# Patient Record
Sex: MALE | Race: BLACK OR AFRICAN AMERICAN | NOT HISPANIC OR LATINO | URBAN - METROPOLITAN AREA
[De-identification: names, ages, dates, MRNs, and addresses within clinical notes are randomized per-mention and may not be internally consistent; named-entity substitution may affect disease eponyms.]

---

## 2017-09-29 ENCOUNTER — EMERGENCY (EMERGENCY)
Facility: HOSPITAL | Age: 47
LOS: 1 days | Discharge: HOME | End: 2017-09-29
Attending: EMERGENCY MEDICINE
Payer: MEDICAID

## 2017-09-29 VITALS
SYSTOLIC BLOOD PRESSURE: 130 MMHG | HEART RATE: 80 BPM | RESPIRATION RATE: 18 BRPM | OXYGEN SATURATION: 99 % | DIASTOLIC BLOOD PRESSURE: 85 MMHG

## 2017-09-29 VITALS
OXYGEN SATURATION: 98 % | WEIGHT: 240.08 LBS | DIASTOLIC BLOOD PRESSURE: 85 MMHG | HEIGHT: 69 IN | SYSTOLIC BLOOD PRESSURE: 137 MMHG | HEART RATE: 101 BPM | TEMPERATURE: 98 F | RESPIRATION RATE: 18 BRPM

## 2017-09-29 PROCEDURE — 96372 THER/PROPH/DIAG INJ SC/IM: CPT

## 2017-09-29 PROCEDURE — 99284 EMERGENCY DEPT VISIT MOD MDM: CPT

## 2017-09-29 PROCEDURE — 99283 EMERGENCY DEPT VISIT LOW MDM: CPT | Mod: 25

## 2017-09-29 RX ORDER — CYCLOBENZAPRINE HYDROCHLORIDE 10 MG/1
1 TABLET, FILM COATED ORAL
Qty: 20 | Refills: 0 | OUTPATIENT
Start: 2017-09-29 | End: 2017-10-09

## 2017-09-29 RX ORDER — OXYCODONE AND ACETAMINOPHEN 5; 325 MG/1; MG/1
1 TABLET ORAL ONCE
Qty: 0 | Refills: 0 | Status: DISCONTINUED | OUTPATIENT
Start: 2017-09-29 | End: 2017-09-29

## 2017-09-29 RX ORDER — DIAZEPAM 5 MG
10 TABLET ORAL ONCE
Qty: 0 | Refills: 0 | Status: DISCONTINUED | OUTPATIENT
Start: 2017-09-29 | End: 2017-09-29

## 2017-09-29 RX ORDER — DEXAMETHASONE 0.5 MG/5ML
10 ELIXIR ORAL ONCE
Qty: 0 | Refills: 0 | Status: COMPLETED | OUTPATIENT
Start: 2017-09-29 | End: 2017-09-29

## 2017-09-29 RX ORDER — KETOROLAC TROMETHAMINE 30 MG/ML
60 SYRINGE (ML) INJECTION ONCE
Qty: 0 | Refills: 0 | Status: DISCONTINUED | OUTPATIENT
Start: 2017-09-29 | End: 2017-09-29

## 2017-09-29 RX ADMIN — OXYCODONE AND ACETAMINOPHEN 1 TABLET(S): 5; 325 TABLET ORAL at 12:12

## 2017-09-29 RX ADMIN — Medication 60 MILLIGRAM(S): at 12:09

## 2017-09-29 RX ADMIN — Medication 10 MILLIGRAM(S): at 12:10

## 2017-09-29 RX ADMIN — OXYCODONE AND ACETAMINOPHEN 1 TABLET(S): 5; 325 TABLET ORAL at 13:48

## 2017-09-29 RX ADMIN — Medication 10 MILLIGRAM(S): at 12:09

## 2017-09-29 RX ADMIN — Medication 60 MILLIGRAM(S): at 13:48

## 2017-09-29 NOTE — ED PROVIDER NOTE - MEDICAL DECISION MAKING DETAILS
Patient will f/u with spine for MRI w/o fail.  Patient given detailed discharge and return instructions and verbalized understanding.  Patient will follow up without fail.  All questions answered.

## 2017-09-29 NOTE — ED ADULT NURSE NOTE - OBJECTIVE STATEMENT
Assumed patient care at 1159.  Pt reports sneezing this mornig and throwing his back out.  C/O pain to left side lower back.  States his big toe is numb.  Moves left leg without difficulty.  Pt states he fell out work in 2012 and injured his back.  His toe has been numb since then.  He has seen pain management in the past for injections.

## 2017-09-29 NOTE — ED ADULT NURSE NOTE - PMH
Diabetes mellitus, type II    Flank pain, acute    GERD (gastroesophageal reflux disease)    Sciatica Diabetes mellitus, type II    Flank pain, acute    GERD (gastroesophageal reflux disease)    Lumbar disc herniation    Sciatica

## 2017-09-29 NOTE — ED PROVIDER NOTE - OBJECTIVE STATEMENT
CC: left low back pain  Presenting symptoms: 46yo male with chronic back pain and multiple exacerbations in past and has had full workup including MRIs and epidural injections.  Patient states was walking today and sneezed and threw his back out with left low back pain radiating to buttock and left leg.  Patient states he has had same multiple times in past.    Pertinent Positives: + low back pain  Pertinent Negatives: no CP, no SOB, no fever, no HA, no abd pain, no N/V  Timing: just prior to arrival  Quality: spasm  Radiation: left buttock and leg  Severity: severe  Aggravating Factors: movement  Relieving Factors: none

## 2017-09-29 NOTE — ED PROVIDER NOTE - PMH
Diabetes mellitus, type II    Flank pain, acute    GERD (gastroesophageal reflux disease)    Lumbar disc herniation    Sciatica

## 2017-09-29 NOTE — ED PROVIDER NOTE - NS ED ROS FT
no fever  no chest pain  no SOB  no abd pain  no HA  + back pain  All other ROS negative except as per HPI

## 2017-09-29 NOTE — ED ADULT TRIAGE NOTE - CHIEF COMPLAINT QUOTE
Patient BIBA, states he sneezed this morning and "threw his back out", states this has happened before, pain to lower back

## 2017-09-29 NOTE — ED ADULT NURSE REASSESSMENT NOTE - NS ED NURSE REASSESS COMMENT FT1
Pt has no transportation home.   called and made aware.  Will see patient.
Tolerating BIPAP.  Sister at bedside.

## 2017-11-09 ENCOUNTER — EMERGENCY (EMERGENCY)
Facility: HOSPITAL | Age: 47
LOS: 1 days | Discharge: HOME | End: 2017-11-09
Attending: EMERGENCY MEDICINE | Admitting: EMERGENCY MEDICINE
Payer: MEDICAID

## 2017-11-09 VITALS
RESPIRATION RATE: 18 BRPM | DIASTOLIC BLOOD PRESSURE: 76 MMHG | TEMPERATURE: 98 F | HEART RATE: 68 BPM | OXYGEN SATURATION: 98 % | WEIGHT: 229.94 LBS | HEIGHT: 70 IN | SYSTOLIC BLOOD PRESSURE: 121 MMHG

## 2017-11-09 PROCEDURE — 99284 EMERGENCY DEPT VISIT MOD MDM: CPT

## 2017-11-09 PROCEDURE — 99283 EMERGENCY DEPT VISIT LOW MDM: CPT | Mod: 25

## 2017-11-09 PROCEDURE — 93010 ELECTROCARDIOGRAM REPORT: CPT

## 2017-11-09 PROCEDURE — 93005 ELECTROCARDIOGRAM TRACING: CPT

## 2017-11-09 NOTE — ED PROVIDER NOTE - PHYSICAL EXAMINATION
neuro: CN II - XII intact, EOMI, PERRL, no papilledema, 5/5 muscle strength x 4 extremities, no sensory deficits, 2+ dtr globally, negative babinski, no ataxic gait, normal VALERIO and FNT, normal romberg

## 2017-11-09 NOTE — ED PROVIDER NOTE - MEDICAL DECISION MAKING DETAILS
return to ed for intractable HA, persistent vomiting, or new onset motor/sensory deficits   no zbigniew pain or saob cardio f.u without fail advised

## 2017-11-09 NOTE — ED PROVIDER NOTE - OBJECTIVE STATEMENT
pt presents after witnessed syncopal episode no seizure like activity . denies any acute pain no  hemoptysis no h/o dvt or pe . denies fever. denies HA or neck pain. no chest pain or sob. no abd pain. no n/v/d. no urinary f/u/d. no back pain. no motor or sensory deficits. denies illicit drug use. no recent travel. no rash. no other acute issues symptoms or concerns no leg swelling

## 2017-12-13 ENCOUNTER — APPOINTMENT (OUTPATIENT)
Dept: NEUROLOGY | Facility: CLINIC | Age: 47
End: 2017-12-13
Payer: MEDICAID

## 2017-12-13 DIAGNOSIS — Z87.891 PERSONAL HISTORY OF NICOTINE DEPENDENCE: ICD-10-CM

## 2017-12-13 PROCEDURE — 99203 OFFICE O/P NEW LOW 30 MIN: CPT

## 2017-12-18 ENCOUNTER — APPOINTMENT (OUTPATIENT)
Dept: NEUROLOGY | Facility: CLINIC | Age: 47
End: 2017-12-18

## 2017-12-27 ENCOUNTER — APPOINTMENT (OUTPATIENT)
Dept: NEUROLOGY | Facility: CLINIC | Age: 47
End: 2017-12-27

## 2018-01-08 ENCOUNTER — APPOINTMENT (OUTPATIENT)
Dept: NEUROLOGY | Facility: CLINIC | Age: 48
End: 2018-01-08

## 2018-07-25 PROBLEM — M51.26 OTHER INTERVERTEBRAL DISC DISPLACEMENT, LUMBAR REGION: Chronic | Status: ACTIVE | Noted: 2017-09-29

## 2018-08-01 ENCOUNTER — APPOINTMENT (OUTPATIENT)
Dept: CARDIOLOGY | Facility: CLINIC | Age: 48
End: 2018-08-01
Payer: MEDICAID

## 2018-08-01 ENCOUNTER — NON-APPOINTMENT (OUTPATIENT)
Age: 48
End: 2018-08-01

## 2018-08-01 VITALS
BODY MASS INDEX: 34.56 KG/M2 | DIASTOLIC BLOOD PRESSURE: 73 MMHG | OXYGEN SATURATION: 96 % | WEIGHT: 228 LBS | HEART RATE: 71 BPM | SYSTOLIC BLOOD PRESSURE: 116 MMHG | HEIGHT: 68 IN

## 2018-08-01 PROCEDURE — 93000 ELECTROCARDIOGRAM COMPLETE: CPT

## 2018-08-01 PROCEDURE — 99244 OFF/OP CNSLTJ NEW/EST MOD 40: CPT

## 2018-08-01 RX ORDER — ESOMEPRAZOLE MAGNESIUM 40 MG/1
40 CAPSULE, DELAYED RELEASE ORAL
Refills: 0 | Status: DISCONTINUED | COMMUNITY
End: 2018-08-01

## 2018-08-02 ENCOUNTER — EMERGENCY (EMERGENCY)
Facility: HOSPITAL | Age: 48
LOS: 1 days | Discharge: HOME | End: 2018-08-02
Attending: EMERGENCY MEDICINE
Payer: MEDICAID

## 2018-08-02 VITALS
DIASTOLIC BLOOD PRESSURE: 91 MMHG | OXYGEN SATURATION: 99 % | WEIGHT: 230.6 LBS | RESPIRATION RATE: 14 BRPM | HEIGHT: 69 IN | TEMPERATURE: 97 F | SYSTOLIC BLOOD PRESSURE: 141 MMHG | HEART RATE: 58 BPM

## 2018-08-02 VITALS
DIASTOLIC BLOOD PRESSURE: 72 MMHG | HEART RATE: 63 BPM | RESPIRATION RATE: 14 BRPM | SYSTOLIC BLOOD PRESSURE: 126 MMHG | OXYGEN SATURATION: 98 %

## 2018-08-02 LAB
ALBUMIN SERPL ELPH-MCNC: 3.7 G/DL — SIGNIFICANT CHANGE UP (ref 3.3–5)
ALP SERPL-CCNC: 82 U/L — SIGNIFICANT CHANGE UP (ref 40–120)
ALT FLD-CCNC: 55 U/L — SIGNIFICANT CHANGE UP (ref 12–78)
ANION GAP SERPL CALC-SCNC: 8 MMOL/L — SIGNIFICANT CHANGE UP (ref 5–17)
APTT BLD: 34.6 SEC — SIGNIFICANT CHANGE UP (ref 27.5–37.4)
AST SERPL-CCNC: 30 U/L — SIGNIFICANT CHANGE UP (ref 15–37)
BASOPHILS # BLD AUTO: 0.04 K/UL — SIGNIFICANT CHANGE UP (ref 0–0.2)
BASOPHILS NFR BLD AUTO: 0.7 % — SIGNIFICANT CHANGE UP (ref 0–2)
BILIRUB SERPL-MCNC: 0.4 MG/DL — SIGNIFICANT CHANGE UP (ref 0.2–1.2)
BUN SERPL-MCNC: 15 MG/DL — SIGNIFICANT CHANGE UP (ref 7–23)
CALCIUM SERPL-MCNC: 8.8 MG/DL — SIGNIFICANT CHANGE UP (ref 8.5–10.1)
CHLORIDE SERPL-SCNC: 108 MMOL/L — SIGNIFICANT CHANGE UP (ref 96–108)
CK MB BLD-MCNC: 0.6 % — SIGNIFICANT CHANGE UP (ref 0–3.5)
CK MB BLD-MCNC: 0.6 % — SIGNIFICANT CHANGE UP (ref 0–3.5)
CK MB CFR SERPL CALC: 4.3 NG/ML — HIGH (ref 0–3.6)
CK MB CFR SERPL CALC: 5.1 NG/ML — HIGH (ref 0–3.6)
CK SERPL-CCNC: 741 U/L — HIGH (ref 26–308)
CK SERPL-CCNC: 809 U/L — HIGH (ref 26–308)
CO2 SERPL-SCNC: 26 MMOL/L — SIGNIFICANT CHANGE UP (ref 22–31)
CREAT SERPL-MCNC: 1 MG/DL — SIGNIFICANT CHANGE UP (ref 0.5–1.3)
EOSINOPHIL # BLD AUTO: 0.27 K/UL — SIGNIFICANT CHANGE UP (ref 0–0.5)
EOSINOPHIL NFR BLD AUTO: 4.4 % — SIGNIFICANT CHANGE UP (ref 0–6)
GLUCOSE SERPL-MCNC: 78 MG/DL — SIGNIFICANT CHANGE UP (ref 70–99)
HCT VFR BLD CALC: 38.6 % — LOW (ref 39–50)
HGB BLD-MCNC: 13.6 G/DL — SIGNIFICANT CHANGE UP (ref 13–17)
IMM GRANULOCYTES NFR BLD AUTO: 0.3 % — SIGNIFICANT CHANGE UP (ref 0–1.5)
INR BLD: 1 RATIO — SIGNIFICANT CHANGE UP (ref 0.88–1.16)
LIDOCAIN IGE QN: 119 U/L — SIGNIFICANT CHANGE UP (ref 73–393)
LYMPHOCYTES # BLD AUTO: 1.85 K/UL — SIGNIFICANT CHANGE UP (ref 1–3.3)
LYMPHOCYTES # BLD AUTO: 30.4 % — SIGNIFICANT CHANGE UP (ref 13–44)
MCHC RBC-ENTMCNC: 32.6 PG — SIGNIFICANT CHANGE UP (ref 27–34)
MCHC RBC-ENTMCNC: 35.2 GM/DL — SIGNIFICANT CHANGE UP (ref 32–36)
MCV RBC AUTO: 92.6 FL — SIGNIFICANT CHANGE UP (ref 80–100)
MONOCYTES # BLD AUTO: 0.52 K/UL — SIGNIFICANT CHANGE UP (ref 0–0.9)
MONOCYTES NFR BLD AUTO: 8.6 % — SIGNIFICANT CHANGE UP (ref 2–14)
NEUTROPHILS # BLD AUTO: 3.38 K/UL — SIGNIFICANT CHANGE UP (ref 1.8–7.4)
NEUTROPHILS NFR BLD AUTO: 55.6 % — SIGNIFICANT CHANGE UP (ref 43–77)
PLATELET # BLD AUTO: 243 K/UL — SIGNIFICANT CHANGE UP (ref 150–400)
POTASSIUM SERPL-MCNC: 3.9 MMOL/L — SIGNIFICANT CHANGE UP (ref 3.5–5.3)
POTASSIUM SERPL-SCNC: 3.9 MMOL/L — SIGNIFICANT CHANGE UP (ref 3.5–5.3)
PROT SERPL-MCNC: 7.2 G/DL — SIGNIFICANT CHANGE UP (ref 6–8.3)
PROTHROM AB SERPL-ACNC: 10.9 SEC — SIGNIFICANT CHANGE UP (ref 9.8–12.7)
RBC # BLD: 4.17 M/UL — LOW (ref 4.2–5.8)
RBC # FLD: 12.6 % — SIGNIFICANT CHANGE UP (ref 10.3–14.5)
SODIUM SERPL-SCNC: 142 MMOL/L — SIGNIFICANT CHANGE UP (ref 135–145)
TROPONIN I SERPL-MCNC: <.015 NG/ML — SIGNIFICANT CHANGE UP (ref 0.01–0.04)
TROPONIN I SERPL-MCNC: <.015 NG/ML — SIGNIFICANT CHANGE UP (ref 0.01–0.04)
WBC # BLD: 6.08 K/UL — SIGNIFICANT CHANGE UP (ref 3.8–10.5)
WBC # FLD AUTO: 6.08 K/UL — SIGNIFICANT CHANGE UP (ref 3.8–10.5)

## 2018-08-02 PROCEDURE — 71045 X-RAY EXAM CHEST 1 VIEW: CPT | Mod: 26

## 2018-08-02 PROCEDURE — 99285 EMERGENCY DEPT VISIT HI MDM: CPT

## 2018-08-02 PROCEDURE — 80053 COMPREHEN METABOLIC PANEL: CPT

## 2018-08-02 PROCEDURE — 85730 THROMBOPLASTIN TIME PARTIAL: CPT

## 2018-08-02 PROCEDURE — 99284 EMERGENCY DEPT VISIT MOD MDM: CPT

## 2018-08-02 PROCEDURE — 82550 ASSAY OF CK (CPK): CPT

## 2018-08-02 PROCEDURE — 85379 FIBRIN DEGRADATION QUANT: CPT

## 2018-08-02 PROCEDURE — 99284 EMERGENCY DEPT VISIT MOD MDM: CPT | Mod: 25

## 2018-08-02 PROCEDURE — 36415 COLL VENOUS BLD VENIPUNCTURE: CPT

## 2018-08-02 PROCEDURE — 93005 ELECTROCARDIOGRAM TRACING: CPT

## 2018-08-02 PROCEDURE — 83690 ASSAY OF LIPASE: CPT

## 2018-08-02 PROCEDURE — 85027 COMPLETE CBC AUTOMATED: CPT

## 2018-08-02 PROCEDURE — 84484 ASSAY OF TROPONIN QUANT: CPT

## 2018-08-02 PROCEDURE — 85610 PROTHROMBIN TIME: CPT

## 2018-08-02 PROCEDURE — 82553 CREATINE MB FRACTION: CPT

## 2018-08-02 PROCEDURE — 71045 X-RAY EXAM CHEST 1 VIEW: CPT

## 2018-08-02 RX ORDER — SODIUM CHLORIDE 9 MG/ML
1000 INJECTION INTRAMUSCULAR; INTRAVENOUS; SUBCUTANEOUS ONCE
Qty: 0 | Refills: 0 | Status: COMPLETED | OUTPATIENT
Start: 2018-08-02 | End: 2018-08-02

## 2018-08-02 RX ORDER — SODIUM CHLORIDE 9 MG/ML
3 INJECTION INTRAMUSCULAR; INTRAVENOUS; SUBCUTANEOUS ONCE
Qty: 0 | Refills: 0 | Status: COMPLETED | OUTPATIENT
Start: 2018-08-02 | End: 2018-08-02

## 2018-08-02 RX ADMIN — SODIUM CHLORIDE 1000 MILLILITER(S): 9 INJECTION INTRAMUSCULAR; INTRAVENOUS; SUBCUTANEOUS at 11:25

## 2018-08-02 RX ADMIN — SODIUM CHLORIDE 1000 MILLILITER(S): 9 INJECTION INTRAMUSCULAR; INTRAVENOUS; SUBCUTANEOUS at 09:56

## 2018-08-02 RX ADMIN — SODIUM CHLORIDE 3 MILLILITER(S): 9 INJECTION INTRAMUSCULAR; INTRAVENOUS; SUBCUTANEOUS at 09:12

## 2018-08-02 RX ADMIN — SODIUM CHLORIDE 1000 MILLILITER(S): 9 INJECTION INTRAMUSCULAR; INTRAVENOUS; SUBCUTANEOUS at 10:56

## 2018-08-02 NOTE — ED ADULT TRIAGE NOTE - NS ED NOTE AC HIGH RISK COUNTRIES
I have reviewed discharge instructions with the parent. The parent verbalized understanding. Patient left ED via Discharge Method: ambulatory to Home with (mother). Opportunity for questions and clarification provided. Patient given 0 scripts. To continue your aftercare when you leave the hospital, you may receive an automated call from our care team to check in on how you are doing. This is a free service and part of our promise to provide the best care and service to meet your aftercare needs.  If you have questions, or wish to unsubscribe from this service please call [phone]. Thank you for Choosing our [facility].
No

## 2018-08-02 NOTE — ED PROVIDER NOTE - CARE PLAN
Principal Discharge DX:	Chest pain  Assessment and plan of treatment:	Return to the ED for any new or worsening symptoms  Take your medication as prescribed  Stop your energy drink intake   Follow up with your cardiologist, call tomorrow to update them   Advance activity as tolerated  Secondary Diagnosis:	Palpitations  Secondary Diagnosis:	Near syncope

## 2018-08-02 NOTE — CONSULT NOTE ADULT - SUBJECTIVE AND OBJECTIVE BOX
[facility]         Janene Flannery, Aguila, Yuliet, Jeannie, Kourtney Tai        [phone] (office)    CHIEF COMPLAINT: Patient is a 48y old  Male who presents with a chief complaint of chest pain.    HPI: Pt is a 49 yo male who presents to the ED with a cc of chest pain and near syncope.  PMHx of HLD, GERD, pre-DM.  Pt reports that last Thursday he noted that she was experiencing episodes of chest pressure with associated palpitations.  He admits that he was drinking 5 24oz + 2 16oz monsters per day and thought that these symptoms may be related.  He followed up with his PMD and was then sent to a cardiologist.  He saw the cardiologist yesterday and was fitted with a Holter monitor. He then drank 2 16 oz monsters after his appointment.  He is suppose to follow up for a nuclear stress and ECHO.  He reports that he has cut down on his Monster intake but did have 2 cans yesterday.  Today pt reports that he was standing at the sink cutting steak when he developed palpitations and chest pressure.  He then reports that he felt himself falling to the ground onto one knee.  Denies striking his head, denies LOC.  When pt stood he felt warm and drank some water.  He then called EMS and was taken to the ED for further elv.  On arrival pt reports that all symptoms had resolved.  Denies HA, visual changes, N/V/D/C, SOB, abd pain, ext numbness or weakness.  Pt does report a family history of early heart disease in his father and maternal uncle.  He has no known underlying CAD.  Denies any recent changes to his exercise tolerance.  Pt is a former smoker    Sees Dr. Pruitt.      PAST MEDICAL & SURGICAL HISTORY:  Pre-diabetes  GERD (gastroesophageal reflux disease)  HLD (hyperlipidemia)  Lumbar disc herniation  GERD (gastroesophageal reflux disease)  Sciatica  Diabetes mellitus, type II  Flank pain, acute  No significant past surgical history  No significant past surgical history      SOCIAL HISTORY: quit smoking in 2009, alcohol or illicit drug use    FAMILY HISTORY:  Father with heart disease  maternal uncle with heart disease.     Outpatient medications:  pantoprazole  MEDICATIONS  (STANDING):  sodium chloride 0.9% Bolus 1000 milliLiter(s) IV Bolus once    MEDICATIONS  (PRN):      Allergies    tramadol (Rash)  tramadol (Urticaria)    Intolerances        REVIEW OF SYSTEMS: Is negative for eye, ENT, GI, , allergic, dermatologic, musculoskeletal and neurologic, except as described above.    VITAL SIGNS:   Vital Signs Last 24 Hrs  T(C): 36.1 (02 Aug 2018 08:48), Max: 36.1 (02 Aug 2018 08:48)  T(F): 97 (02 Aug 2018 08:48), Max: 97 (02 Aug 2018 08:48)  HR: 58 (02 Aug 2018 08:48) (58 - 58)  BP: 141/91 (02 Aug 2018 08:48) (141/91 - 141/91)  BP(mean): --  RR: 14 (02 Aug 2018 08:48) (14 - 14)  SpO2: 99% (02 Aug 2018 08:48) (99% - 99%)    I&O's Summary      PHYSICAL EXAM:    Constitutional: NAD, awake and alert, well-developed  Eyes:  EOMI, no oral cyanosis, conjunctivae clear, anicteric.  Pulmonary: Non-labored, breath sounds are clear bilaterally, no wheezing, rales or rhonchi  Cardiovascular:  regular S1 and S2. No murmur.  No rubs, gallops or clicks  Gastrointestinal: Bowel Sounds present, soft, nontender.   Lymph: No peripheral edema.   Neurological: Alert, strength and sensitivity are grossly intact  Skin: No obvious lesions/rashes.   Psych:  Mood & affect appropriate .    LABS: All Labs Reviewed:                        13.6   6.08  )-----------( 243      ( 02 Aug 2018 09:16 )             38.6     02 Aug 2018 09:16    142    |  108    |  15     ----------------------------<  78     3.9     |  26     |  1.00     Ca    8.8        02 Aug 2018 09:16    TPro  7.2    /  Alb  3.7    /  TBili  0.4    /  DBili  x      /  AST  30     /  ALT  55     /  AlkPhos  82     02 Aug 2018 09:16    PT/INR - ( 02 Aug 2018 09:16 )   PT: 10.9 sec;   INR: 1.00 ratio         PTT - ( 02 Aug 2018 09:16 )  PTT:34.6 sec  CARDIAC MARKERS ( 02 Aug 2018 09:16 )  <.015 ng/mL / x     / 809 U/L / x     / 5.1 ng/mL      Blood Culture:         RADIOLOGY: < from: Xray Chest 1 View- PORTABLE-Urgent (08.02.18 @ 09:45) >  EXAM:  XR CHEST PORTABLE URGENT 1V                            PROCEDURE DATE:  08/02/2018          INTERPRETATION:  AP semierect chest on August 2, 2018 at 9:40 AM. Patient   has chest pain.    COMPARISON: None available.    Heart is magnified by technique. No lung or pleural finding is evident.    An external electronic device projects over the left chest.    IMPRESSION: No infiltrate.                TAQUERIA MAI M.D., ATTENDING RADIOLOGIST  This document has been electronically signed. Aug  2 2018  9:48AM    < end of copied text >    EKG: sinus bradycardia, ST elevation

## 2018-08-02 NOTE — ED ADULT NURSE NOTE - OBJECTIVE STATEMENT
Assumed patient care @ 0910. Pt received sitting on stretcher in no apparent distress, SB on cardiac monitor 60s. Pt AOx3 C/O midsternal chest pressure which was associated with his legs giving out and falling to ground, denies LOC or hitting head. Patient was short of breath at the time. Chest pressure/SOB have subsided. Patient with a heart monitor due to the same situation having last week, except falling to the ground. Patient has been taking energy drinks x 4 weeks. Lungs clear to ausculation, respirations even unlabored. Abd soft non tender, + bowel sounds x 4quadrants. Denies Nausea, Vomiting, Diarrhea, Chills, Fever. Skin warm, dry, color appropriate for age and race.

## 2018-08-02 NOTE — ED PROVIDER NOTE - PROGRESS NOTE DETAILS
Pt remains chest pain free at this time.  Seen by cardiology Dr. Oconnell, stable for discharge home with outpatient follow up.  Results of labs and images reviewed, copy provided, all questions answered.  Stable for discharge home with outpatient follow.  Has scheduled stress test on the 13

## 2018-08-02 NOTE — ED PROVIDER NOTE - OBJECTIVE STATEMENT
Pt is a 49 yo male who presents to the ED with a cc of chest pain and near syncope.  PMHx of HLD, GERD, pre-DM.  Pt reports that last Thursday he noted that she was experiencing episodes of chest pressure with associated palpitations.  He admits that he was drinking several Monsters a day and thought that these symptoms may be related.  He followed up with his PMD and was then sent to a cardiologist.  He saw the cardiologist yesterday and was fitted with a Holter monitor.  He is suppose to follow up for a nuclear stress and ECHO.  He reports that he has cut down on his Monster intake but did have 2 cans yesterday.  Today pt reports that he was standing at the sink cutting steak when he developed palpitations and chest pressure.  He then reports that he felt himself falling to the ground onto one knee.  Denies striking his head, denies LOC.  When pt stood he felt warm and drank some water.  He then called EMS and was taken to the ED for further elv.  On arrival pt reports that all symptoms had resolved.  Denies HA, visual changes, N/V/D/C, SOB, abd pain, ext numbness or weakness.  Pt does report a family history of early heart disease in his father and maternal uncle.  He has no known underlying CAD.  Denies any recent changes to his exercise tolerance.  Pt is a former smoker

## 2018-08-02 NOTE — ED ADULT NURSE NOTE - ADDITIONAL PRINTED INSTRUCTIONS GIVEN
pt d/c in stable condition, no apparent distress noted at this time. Pt able to ambulate with steady gait. Pt understands to followup with cards

## 2018-08-02 NOTE — ED PROVIDER NOTE - MEDICAL DECISION MAKING DETAILS
cbc, cmp, lipase, cardiac enzymes, d dimer, INR, EKG, chest x-ray, IVF, cardiology consult, observation

## 2018-08-02 NOTE — ED ADULT NURSE NOTE - NSIMPLEMENTINTERV_GEN_ALL_ED
Implemented All Universal Safety Interventions:  [place] to call system. Call bell, personal items and telephone within reach. Instruct patient to call for assistance. Room bathroom lighting operational. Non-slip footwear when patient is off stretcher. Physically safe environment: no spills, clutter or unnecessary equipment. Stretcher in lowest position, wheels locked, appropriate side rails in place.

## 2018-08-02 NOTE — ED ADULT NURSE REASSESSMENT NOTE - NS ED NURSE REASSESS COMMENT FT1
Patient aware that he is awaiting cardiologist to come visit. Patient denies chest pain/pressure at this time. IVF infusing without difficulty.

## 2018-08-02 NOTE — CONSULT NOTE ADULT - ATTENDING COMMENTS
Seen/examined. Agree with above.  Palpitations and chest pain in the setting of Monster intake. Significant family history of CAD  If second set of CE is negative, plan for d/c home with outpatient stress and echo.  He is currently wearing a holter monitor.

## 2018-08-02 NOTE — CONSULT NOTE ADULT - ASSESSMENT
Pt is a 47 yo male who presents to the ED with a cc of chest pain and near syncope.  PMHx of HLD, GERD, pre-DM.  Pt reports that last Thursday he noted that she was experiencing episodes of chest pressure with associated palpitations.  He admits that he was drinking 5 24oz + 2 16oz monsters per day and thought that these symptoms may be related.  He followed up with his PMD and was then sent to a cardiologist.  He saw the cardiologist yesterday and was fitted with a Holter monitor. He then drank 2 16 oz monsters after his appointment.  He is suppose to follow up for a nuclear stress and ECHO.  He reports that he has cut down on his Monster intake but did have 2 cans yesterday.  Today pt reports that he was standing at the sink cutting steak when he developed palpitations and chest pressure.  He then reports that he felt himself falling to the ground onto one knee.  Denies striking his head, denies LOC.  When pt stood he felt warm and drank some water.  He then called EMS and was taken to the ED for further elv.  On arrival pt reports that all symptoms had resolved.  Denies HA, visual changes, N/V/D/C, SOB, abd pain, ext numbness or weakness.  Pt does report a family history of early heart disease in his father and maternal uncle.  He has no known underlying CAD.  Denies any recent changes to his exercise tolerance.  Pt is a former smoker  Sees Dr. Pruitt.  -EKG shows  -no known arrythmia  -no signs of volume overload  -no known valvular disease  -check echo to evaluate ventricular function  -trend CE  - Pt is a 49 yo male who presents to the ED with a cc of chest pain and near syncope.  PMHx of HLD, GERD, pre-DM.  Pt reports that last Thursday he noted that she was experiencing episodes of chest pressure with associated palpitations.  He admits that he was drinking 5 24oz + 2 16oz monsters per day and thought that these symptoms may be related.  He followed up with his PMD and was then sent to a cardiologist.  He saw the cardiologist yesterday and was fitted with a Holter monitor. He then drank 2 16 oz monsters after his appointment.  He is suppose to follow up for a nuclear stress and ECHO.  He reports that he has cut down on his Monster intake but did have 2 cans yesterday.  Today pt reports that he was standing at the sink cutting steak when he developed palpitations and chest pressure.  He then reports that he felt himself falling to the ground onto one knee.  Denies striking his head, denies LOC.  When pt stood he felt warm and drank some water.  He then called EMS and was taken to the ED for further elv.  On arrival pt reports that all symptoms had resolved.  Denies HA, visual changes, N/V/D/C, SOB, abd pain, ext numbness or weakness.  Pt does report a family history of early heart disease in his father and maternal uncle.  He has no known underlying CAD.  Denies any recent changes to his exercise tolerance.  Pt is a former smoker  Sees Dr. Pruitt.  -EKG shows SR, with nonspecific T wave abn in III  -no known arrythmia, though is wearing holter monitor  -no signs of volume overload  -no known valvular disease  -plan for echo and stress as outpatient on 8/13  -1st set of CE with normal troponin, elevated CK but normal CK MB, likely rhabdo, not ACS  -He should stop his Monster intake  -send a second set of CE in 6-8 hours. If negative, plan for d/c home with follow up with Dr. Pruitt.

## 2018-08-02 NOTE — ED PROVIDER NOTE - PLAN OF CARE
Return to the ED for any new or worsening symptoms  Take your medication as prescribed  Stop your energy drink intake   Follow up with your cardiologist, call tomorrow to update them   Advance activity as tolerated

## 2018-08-13 ENCOUNTER — APPOINTMENT (OUTPATIENT)
Age: 48
End: 2018-08-13
Payer: MEDICAID

## 2018-08-13 ENCOUNTER — TRANSCRIPTION ENCOUNTER (OUTPATIENT)
Age: 48
End: 2018-08-13

## 2018-08-13 PROCEDURE — 78452 HT MUSCLE IMAGE SPECT MULT: CPT

## 2018-08-13 PROCEDURE — 93015 CV STRESS TEST SUPVJ I&R: CPT

## 2018-08-13 PROCEDURE — 93306 TTE W/DOPPLER COMPLETE: CPT

## 2018-08-13 PROCEDURE — A9500: CPT

## 2018-09-26 ENCOUNTER — APPOINTMENT (OUTPATIENT)
Age: 48
End: 2018-09-26

## 2018-11-29 ENCOUNTER — EMERGENCY (EMERGENCY)
Facility: HOSPITAL | Age: 48
LOS: 1 days | Discharge: HOME | End: 2018-11-29
Attending: EMERGENCY MEDICINE
Payer: MEDICAID

## 2018-11-29 VITALS
OXYGEN SATURATION: 98 % | TEMPERATURE: 98 F | WEIGHT: 283.07 LBS | HEIGHT: 69 IN | DIASTOLIC BLOOD PRESSURE: 95 MMHG | RESPIRATION RATE: 16 BRPM | HEART RATE: 99 BPM | SYSTOLIC BLOOD PRESSURE: 162 MMHG

## 2018-11-29 PROBLEM — K21.9 GASTRO-ESOPHAGEAL REFLUX DISEASE WITHOUT ESOPHAGITIS: Chronic | Status: ACTIVE | Noted: 2018-08-02

## 2018-11-29 PROBLEM — E78.5 HYPERLIPIDEMIA, UNSPECIFIED: Chronic | Status: ACTIVE | Noted: 2018-08-02

## 2018-11-29 PROCEDURE — 72100 X-RAY EXAM L-S SPINE 2/3 VWS: CPT | Mod: 26

## 2018-11-29 PROCEDURE — 72100 X-RAY EXAM L-S SPINE 2/3 VWS: CPT

## 2018-11-29 PROCEDURE — 96372 THER/PROPH/DIAG INJ SC/IM: CPT

## 2018-11-29 PROCEDURE — 99284 EMERGENCY DEPT VISIT MOD MDM: CPT | Mod: 25

## 2018-11-29 PROCEDURE — 99283 EMERGENCY DEPT VISIT LOW MDM: CPT

## 2018-11-29 RX ORDER — CYCLOBENZAPRINE HYDROCHLORIDE 10 MG/1
1 TABLET, FILM COATED ORAL
Qty: 15 | Refills: 0
Start: 2018-11-29 | End: 2018-12-03

## 2018-11-29 RX ORDER — LIDOCAINE 4 G/100G
1 CREAM TOPICAL ONCE
Qty: 0 | Refills: 0 | Status: COMPLETED | OUTPATIENT
Start: 2018-11-29 | End: 2018-11-29

## 2018-11-29 RX ORDER — DIAZEPAM 5 MG
5 TABLET ORAL ONCE
Qty: 0 | Refills: 0 | Status: DISCONTINUED | OUTPATIENT
Start: 2018-11-29 | End: 2018-11-29

## 2018-11-29 RX ORDER — LIDOCAINE 4 G/100G
1 CREAM TOPICAL
Qty: 7 | Refills: 0
Start: 2018-11-29

## 2018-11-29 RX ORDER — KETOROLAC TROMETHAMINE 30 MG/ML
30 SYRINGE (ML) INJECTION ONCE
Qty: 0 | Refills: 0 | Status: DISCONTINUED | OUTPATIENT
Start: 2018-11-29 | End: 2018-11-29

## 2018-11-29 RX ADMIN — Medication 30 MILLIGRAM(S): at 14:03

## 2018-11-29 RX ADMIN — LIDOCAINE 1 PATCH: 4 CREAM TOPICAL at 14:04

## 2018-11-29 RX ADMIN — Medication 5 MILLIGRAM(S): at 14:03

## 2018-11-29 NOTE — ED STATDOCS - OBJECTIVE STATEMENT
Telemedicine assessment was conducted (using real time 2 way audio-video technology) by Dr. Chuyita Park located at [address] [place] [address]  ++++++++++++++++++++++++  Pertinent patient history and initial plan: 47 y/o M pt with hx of lumbar herniated discs presents to ED c/o chronic low back pain and L buttock pain that began last week. Pain is worse with walking. Pt was lifting boxes last week which exacerbated his pain. Pt has taken Tylenol 3 and 4, multiple epidurals and Vicodin in the past with no relief. Denies hematuria, and incontinence of urine or stool. Telemedicine assessment was conducted (using real time 2 way audio-video technology) by Dr. Chuyita aPrk located at [address] [place] [id number]  ++++++++++++++++++++++++  Pertinent patient history and initial plan: 49 y/o M pt with hx of lumbar herniated discs s/p multiple epidurals in the past presents to ED c/o chronic low back pain and L buttock pain that began last week while lifting a box, no direct trauma. Pain is worse with walking. Pt has taken Tylenol 3 and 4, multiple epidurals and Vicodin in the past with no relief. Denies hematuria, and incontinence or retention of urine or stool.    On virtual and self exam pt is well appearing, nad, +right lumbar paraspinous ttp    Plan - low back pain with no red flags, will tx with toradol, lidocaine patch and valium

## 2018-11-29 NOTE — ED PROVIDER NOTE - OBJECTIVE STATEMENT
49 y/o M, PMHx chronic lower back pain presents with 1 week of back pain. Described as severe, sharp, as bad as 10/10 and started suddenly after lifting a light box. Radiates to the left buttocks. Better with back flexion, worse with sudden movement or sneezing. Reports he has had intermittent back pain symptoms since 2013 and was diagnosed with a herniated disc. Patient was offered back surgery in 2014 but he declined at that time. He has taken tylenol 3/4 in the past for the pain with minimal relief but does not currently have any medications. At the moment he is taking extra strength tylenol which does not control symptoms. Reports having 3 epidurals for pain control in the past but without symptomatic improvement.  No fever, chills, weight loss, bladder/ bowel incontinence.  He works as a .

## 2018-11-29 NOTE — ED PROVIDER NOTE - ATTENDING CONTRIBUTION TO CARE
pt comes in c/o low back pain after lifting heavy object radiates to left buttocks   full strength all extremities   tenderness / spinal / paraspinal tenderness   NVi distally

## 2018-11-29 NOTE — ED PROVIDER NOTE - PROGRESS NOTE DETAILS
Mild improvement in symptoms. Discussed red flags for back pain and to come back to ED if any of these symptoms arise. Patient interested in following up with orthopedics.

## 2018-11-29 NOTE — ED ADULT TRIAGE NOTE - CHIEF COMPLAINT QUOTE
'I am having severe low back pain, I have problems with my L4 & L5. " Pt denies injury. Pt  A& OX4,.

## 2018-11-29 NOTE — ED PROVIDER NOTE - MUSCULOSKELETAL MINIMAL EXAM
MUSCLE SPASMS/motor intact/back pain elicited with left leg raise. bilateral upper and lower extremity 5/5 strength. sensation intact.

## 2018-11-29 NOTE — ED ADULT NURSE NOTE - OBJECTIVE STATEMENT
assumed care of patient in results waiting. pt sitting in chair, a&ox3. pt states that lower back pain has been there for years, L4&5 are "bad", has had many pills, patches, etc. dr wanted to fuse bones but pt refused due to limited mobility afterwards. pt states that this extreme discomfort is always there is new. pt states that no long term treatment has worked.

## 2018-11-29 NOTE — ED PROVIDER NOTE - PMH
GERD (gastroesophageal reflux disease)    GERD (gastroesophageal reflux disease)    HLD (hyperlipidemia)    Lumbar disc herniation    Pre-diabetes    Sciatica

## 2018-11-29 NOTE — ED PROVIDER NOTE - CARE PROVIDERS DIRECT ADDRESSES
[email or web address]

## 2018-11-29 NOTE — ED PROVIDER NOTE - CARE PROVIDER_API CALL
Jassi Khalil (), Orthopaedic Surgery  [address]  [place] [address]  Phone: [phone]  Fax: [phone]    Yaquelin Zuluaga), Family Medicine  [address] [place] [address]  Phone: [phone]  Fax: [phone]

## 2018-12-07 NOTE — ED ADULT TRIAGE NOTE - PAIN RATING/NUMBER SCALE (0-10): REST
Pt states that the compazine rx was not included in the prescriptions that were picked up today.  [facility] on [address] in [place].    0

## 2018-12-13 ENCOUNTER — APPOINTMENT (OUTPATIENT)
Dept: ORTHOPEDIC SURGERY | Facility: CLINIC | Age: 48
End: 2018-12-13
Payer: MEDICAID

## 2018-12-13 VITALS
SYSTOLIC BLOOD PRESSURE: 106 MMHG | HEIGHT: 68 IN | DIASTOLIC BLOOD PRESSURE: 70 MMHG | HEART RATE: 112 BPM | WEIGHT: 228 LBS | BODY MASS INDEX: 34.56 KG/M2

## 2018-12-13 DIAGNOSIS — Z82.61 FAMILY HISTORY OF ARTHRITIS: ICD-10-CM

## 2018-12-13 DIAGNOSIS — Z78.9 OTHER SPECIFIED HEALTH STATUS: ICD-10-CM

## 2018-12-13 DIAGNOSIS — Z82.49 FAMILY HISTORY OF ISCHEMIC HEART DISEASE AND OTHER DISEASES OF THE CIRCULATORY SYSTEM: ICD-10-CM

## 2018-12-13 PROCEDURE — 99204 OFFICE O/P NEW MOD 45 MIN: CPT | Mod: 25

## 2018-12-13 PROCEDURE — 96372 THER/PROPH/DIAG INJ SC/IM: CPT

## 2018-12-14 PROBLEM — Z82.61 FAMILY HISTORY OF ARTHRITIS: Status: ACTIVE | Noted: 2018-12-13

## 2018-12-19 ENCOUNTER — OUTPATIENT (OUTPATIENT)
Dept: OUTPATIENT SERVICES | Facility: HOSPITAL | Age: 48
LOS: 1 days | End: 2018-12-19
Payer: MEDICAID

## 2018-12-19 DIAGNOSIS — Z51.89 ENCOUNTER FOR OTHER SPECIFIED AFTERCARE: ICD-10-CM

## 2018-12-19 DIAGNOSIS — M43.17 SPONDYLOLISTHESIS, LUMBOSACRAL REGION: ICD-10-CM

## 2019-01-10 ENCOUNTER — APPOINTMENT (OUTPATIENT)
Dept: ORTHOPEDIC SURGERY | Facility: CLINIC | Age: 49
End: 2019-01-10

## 2019-01-30 PROCEDURE — 97162 PT EVAL MOD COMPLEX 30 MIN: CPT

## 2019-01-30 PROCEDURE — 97010 HOT OR COLD PACKS THERAPY: CPT

## 2019-01-30 PROCEDURE — 97140 MANUAL THERAPY 1/> REGIONS: CPT

## 2019-01-30 PROCEDURE — 97110 THERAPEUTIC EXERCISES: CPT

## 2019-03-14 NOTE — ED ADULT NURSE NOTE - NSSISCREENINGQ3_ED_A_ED
We want to give the best care possible. If you receive a [employer] survey from our office, please take the time to fill out the survey and return it in the envelope provided. Your feedback helps us know how we are doing and we really appreciate it.Thank you.     No

## 2019-04-25 ENCOUNTER — APPOINTMENT (OUTPATIENT)
Dept: PHYSICAL MEDICINE AND REHAB | Facility: CLINIC | Age: 49
End: 2019-04-25

## 2019-07-29 ENCOUNTER — EMERGENCY (EMERGENCY)
Facility: HOSPITAL | Age: 49
LOS: 1 days | Discharge: HOME | End: 2019-07-29
Attending: EMERGENCY MEDICINE
Payer: COMMERCIAL

## 2019-07-29 VITALS
WEIGHT: 235.01 LBS | HEIGHT: 69 IN | OXYGEN SATURATION: 98 % | TEMPERATURE: 98 F | HEART RATE: 80 BPM | DIASTOLIC BLOOD PRESSURE: 86 MMHG | RESPIRATION RATE: 20 BRPM | SYSTOLIC BLOOD PRESSURE: 132 MMHG

## 2019-07-29 LAB
ALBUMIN SERPL ELPH-MCNC: 4.5 G/DL — SIGNIFICANT CHANGE UP (ref 3.3–5.2)
ALP SERPL-CCNC: 74 U/L — SIGNIFICANT CHANGE UP (ref 40–120)
ALT FLD-CCNC: 29 U/L — SIGNIFICANT CHANGE UP
ANION GAP SERPL CALC-SCNC: 11 MMOL/L — SIGNIFICANT CHANGE UP (ref 5–17)
AST SERPL-CCNC: 24 U/L — SIGNIFICANT CHANGE UP
BILIRUB SERPL-MCNC: 0.3 MG/DL — LOW (ref 0.4–2)
BUN SERPL-MCNC: 10 MG/DL — SIGNIFICANT CHANGE UP (ref 8–20)
CALCIUM SERPL-MCNC: 9.3 MG/DL — SIGNIFICANT CHANGE UP (ref 8.6–10.2)
CHLORIDE SERPL-SCNC: 104 MMOL/L — SIGNIFICANT CHANGE UP (ref 98–107)
CO2 SERPL-SCNC: 25 MMOL/L — SIGNIFICANT CHANGE UP (ref 22–29)
CREAT SERPL-MCNC: 0.95 MG/DL — SIGNIFICANT CHANGE UP (ref 0.5–1.3)
GLUCOSE SERPL-MCNC: 94 MG/DL — SIGNIFICANT CHANGE UP (ref 70–115)
HCT VFR BLD CALC: 42.9 % — SIGNIFICANT CHANGE UP (ref 39–50)
HGB BLD-MCNC: 14.6 G/DL — SIGNIFICANT CHANGE UP (ref 13–17)
LIDOCAIN IGE QN: 14 U/L — LOW (ref 22–51)
MCHC RBC-ENTMCNC: 32.4 PG — SIGNIFICANT CHANGE UP (ref 27–34)
MCHC RBC-ENTMCNC: 34 GM/DL — SIGNIFICANT CHANGE UP (ref 32–36)
MCV RBC AUTO: 95.3 FL — SIGNIFICANT CHANGE UP (ref 80–100)
PLATELET # BLD AUTO: 253 K/UL — SIGNIFICANT CHANGE UP (ref 150–400)
POTASSIUM SERPL-MCNC: 3.8 MMOL/L — SIGNIFICANT CHANGE UP (ref 3.5–5.3)
POTASSIUM SERPL-SCNC: 3.8 MMOL/L — SIGNIFICANT CHANGE UP (ref 3.5–5.3)
PROT SERPL-MCNC: 7.7 G/DL — SIGNIFICANT CHANGE UP (ref 6.6–8.7)
RBC # BLD: 4.5 M/UL — SIGNIFICANT CHANGE UP (ref 4.2–5.8)
RBC # FLD: 12.6 % — SIGNIFICANT CHANGE UP (ref 10.3–14.5)
SODIUM SERPL-SCNC: 140 MMOL/L — SIGNIFICANT CHANGE UP (ref 135–145)
TROPONIN T SERPL-MCNC: <0.01 NG/ML — SIGNIFICANT CHANGE UP (ref 0–0.06)
WBC # BLD: 8.02 K/UL — SIGNIFICANT CHANGE UP (ref 3.8–10.5)
WBC # FLD AUTO: 8.02 K/UL — SIGNIFICANT CHANGE UP (ref 3.8–10.5)

## 2019-07-29 PROCEDURE — 84443 ASSAY THYROID STIM HORMONE: CPT

## 2019-07-29 PROCEDURE — 93010 ELECTROCARDIOGRAM REPORT: CPT

## 2019-07-29 PROCEDURE — 71046 X-RAY EXAM CHEST 2 VIEWS: CPT | Mod: 26

## 2019-07-29 PROCEDURE — 99284 EMERGENCY DEPT VISIT MOD MDM: CPT

## 2019-07-29 PROCEDURE — 71046 X-RAY EXAM CHEST 2 VIEWS: CPT

## 2019-07-29 PROCEDURE — 83690 ASSAY OF LIPASE: CPT

## 2019-07-29 PROCEDURE — 93005 ELECTROCARDIOGRAM TRACING: CPT

## 2019-07-29 PROCEDURE — 84484 ASSAY OF TROPONIN QUANT: CPT

## 2019-07-29 PROCEDURE — 36415 COLL VENOUS BLD VENIPUNCTURE: CPT

## 2019-07-29 PROCEDURE — 80053 COMPREHEN METABOLIC PANEL: CPT

## 2019-07-29 PROCEDURE — 85027 COMPLETE CBC AUTOMATED: CPT

## 2019-07-29 PROCEDURE — 99284 EMERGENCY DEPT VISIT MOD MDM: CPT | Mod: 25

## 2019-07-29 RX ORDER — ASPIRIN/CALCIUM CARB/MAGNESIUM 324 MG
162 TABLET ORAL ONCE
Refills: 0 | Status: DISCONTINUED | OUTPATIENT
Start: 2019-07-29 | End: 2019-08-09

## 2019-07-29 NOTE — ED STATDOCS - PROGRESS NOTE DETAILS
50 y/o M pt with PMHx of GERD, GI epidural surgeries, presents to ED c/o intermittent tightness on the middle of his chest beginning while he was driving to work earlier today. Pt also experienced a sensation "of movement," stating that he felt like he was felt like he was falling backwards while he was not moving at all during the episodes. He had an episode of this sensation two weeks ago. Pt denies any CP radiation during the episodes, which each last about 4-5 minutes. Pt is not experiencing any dizziness or pain currently. Denies n/v/d, SOB, pedal edema, syncope, diaphoresis with pain.  Will repeat EKG, troponin, will send to main with monitor for further evaluation. 48 y/o M pt with PMHx of GERD, GI epidural surgeries, presents to ED c/o intermittent tightness on the middle of his chest beginning while he was driving to work earlier today. Pt also experienced a sensation "of movement," stating that he felt like he was felt like he was falling backwards while he was not moving at all during the episodes. He had an episode of this sensation two weeks ago. Pt denies any CP radiation during the episodes, which each last about 4-5 minutes. Pt is not experiencing any dizziness or pain currently. Denies n/v/d, SOB, pedal edema, syncope, diaphoresis with pain.  family cardiac hx in father young age.   ekg 70, nsr, non ischemic.   orders placed sent to [facility] for further eval.

## 2019-07-29 NOTE — ED ADULT NURSE REASSESSMENT NOTE - NS ED NURSE REASSESS COMMENT FT1
report received from offgoing nurse. pt care assumed 1930. Pt received Aox4 resting comfortably in bed, NSR noted on monitor, respirations even and unlabored, no apparent distress noted at this time. pt denies any complaints at this time. pt safety maintained. pt educated on plan of care, pt able to successfully teach back plan of care to RN, RN will continue to reeducate pt during hospital stay.

## 2019-07-29 NOTE — ED ADULT NURSE NOTE - CHPI ED NUR SYMPTOMS NEG
no shortness of breath/no back pain/no diaphoresis/no chills/no congestion/no nausea/no chest pain/no vomiting/no fever/no dizziness

## 2019-07-29 NOTE — ED PROVIDER NOTE - ATTENDING CONTRIBUTION TO CARE
Maira: I performed a face to face bedside interview with patient regarding history of present illness, review of symptoms and past medical history. I completed an independent physical exam.  I have discussed patient's plan of care with resident.   I agree with note as stated above including HISTORY OF PRESENT ILLNESS, HIV, PAST MEDICAL/SURGICAL/FAMILY/SOCIAL HISTORY, ALLERGIES AND HOME MEDICATIONS, REVIEW OF SYSTEMS, PHYSICAL EXAM, MEDICAL DECISION MAKING and any PROGRESS NOTES during the time I functioned as the attending physician for this patient unless otherwise noted. My brief assessment is as follows: 49M h/o GERD presents after an episode of non-radiating non-exertional mid sternal CP a/w palpitations starting while driving to work at 1500. While in the ED had an episode that he felt like he was falling backwards without moving. Had similar pain a year ago and had negative stress but was told it was 2/2 drinking too many Monster drinks. Had 2 16 ounce Monster drinks today. Pt is currently asymptomatic. Denies travel, leg pain, leg swelling, SOB, nausea, vomiting. Offered CDU stay for provocative work up. Pt prefers to follow outpt. Plan for 2 trops. Low risk CP.

## 2019-07-29 NOTE — ED PROVIDER NOTE - OBJECTIVE STATEMENT
48 y/o M non-smoker c/o 1 wk hx 5/10 non-radiating retrosternal chest pain. Describes pain as pressure. Endorses Hx of GERD. Family Hx (2nd degree relative) of sudden cardiac death. Denies hx CAD, HTN, DM. Denies surgical hx. 48 y/o M non-smoker c/o 1 wk hx 5/10 non-radiating retrosternal chest pain. Describes pain as pressure. Endorses Hx of GERD. Family Hx (2nd degree relative) of sudden cardiac death. Denies hx CAD, HTN, DM. Denies surgical hx.    Maira PORTILLO: 49M h/o GERD presents after an episode of non-radiating non-exertional mid sternal CP a/w palpitations starting while driving to work at 1500. While in the ED had an episode that he felt like he was falling backwards without moving. Had similar pain a year ago and had negative stress but was told it was 2/2 drinking too many Monster drinks. Had 2 16 ounce Monster drinks today. Pt is currently asymptomatic. Denies travel, leg pain, leg swelling, SOB, nausea, vomiting.

## 2019-07-29 NOTE — ED PROVIDER NOTE - PROGRESS NOTE DETAILS
Maira PORTILLO: pt offered 2 troponins and CDU stay. Patient does not want to stay in CDU, did agree to staying for 2 troponins. Will follow up with [facility]. 2nd Troponin neg.  Pt stable for d/c with Cardio f/u as outpt

## 2019-07-29 NOTE — ED ADULT NURSE NOTE - OBJECTIVE STATEMENT
Pt A&XO3, amb ad johnson, states he had an episode of syncope with chest tightness, all symptoms have resolved.  Clear bsb, abd soft nondistended, nontender, moving all ext well. CM in place, NSR.  Will continue to monitor.

## 2019-07-29 NOTE — ED ADULT NURSE NOTE - NSIMPLEMENTINTERV_GEN_ALL_ED
Implemented All Universal Safety Interventions:  [place] to call system. Call bell, personal items and telephone within reach. Instruct patient to call for assistance. Room bathroom lighting operational. Non-slip footwear when patient is off stretcher. Physically safe environment: no spills, clutter or unnecessary equipment. Stretcher in lowest position, wheels locked, appropriate side rails in place.

## 2019-07-29 NOTE — ED PROVIDER NOTE - PHYSICAL EXAMINATION
Focused Assessment for CC: Chest Pain      GENERAL:  WNWD Young/Middle-Aged/Elderly M/F  EYE: EOMI, symmetrical lids, normal conjunctiva  ENMT: Atraumatic external nose and ears, moist mucous membranes  NECK: Symmetric, no tracheal deviation  CVS: RRR, No murmurs appreciated.  RESP: Unlabored respiratory effort, no central cyanosis, no evidence for respiratory distress, lungs CTAB  GI: Abdomen is non-distended, non-tympanic, soft and non-tender.  MSK: No edema, no deformities. No ROM limitation. Strength 5/5.  DERM: Skin is warm, dry. No rashes or lesions appreciated during exam.  NEURO: AAOx3. Moving all 4 extremities without limitation. No facial droop. No dysarthria.  PSYCH: Appropriate mood and affect during encounter. Focused Assessment for CC: Chest Pain  NAD, pt resting comfortably upon encounter. RRR, Normal S1, S2, no RGM. No JVD. +2 pulses B/L LE & UE. Lungs CTAB. Capillary refill <2 sec. Abdomen s, nt.     GENERAL:  WNWD Middle-Aged M  EYE: EOMI, symmetrical lids, normal conjunctiva  ENMT: Atraumatic external nose and ears, moist mucous membranes  NECK: Symmetric, no tracheal deviation  CVS: RRR, No murmurs appreciated.  RESP: Unlabored respiratory effort, no central cyanosis, no evidence for respiratory distress, lungs CTAB  GI: Abdomen is non-distended, non-tympanic, soft and non-tender.  MSK: No edema, no deformities. No ROM limitation. Strength 5/5.  DERM: Skin is warm, dry. No rashes or lesions appreciated during exam.  NEURO: AAOx3. Moving all 4 extremities without limitation. No facial droop. No dysarthria.  PSYCH: Appropriate mood and affect during encounter.

## 2019-07-29 NOTE — ED ADULT NURSE NOTE - PMH
Arthritis    GERD (gastroesophageal reflux disease)    GERD (gastroesophageal reflux disease)    HLD (hyperlipidemia)    Lumbar disc herniation    Pre-diabetes    Sciatica

## 2019-08-01 ENCOUNTER — OUTPATIENT (OUTPATIENT)
Dept: OUTPATIENT SERVICES | Facility: HOSPITAL | Age: 49
LOS: 1 days | End: 2019-08-01
Payer: COMMERCIAL

## 2019-08-01 PROCEDURE — G9001: CPT

## 2019-08-16 DIAGNOSIS — Z71.89 OTHER SPECIFIED COUNSELING: ICD-10-CM

## 2019-08-17 PROBLEM — M19.90 UNSPECIFIED OSTEOARTHRITIS, UNSPECIFIED SITE: Chronic | Status: ACTIVE | Noted: 2019-07-29

## 2020-03-10 ENCOUNTER — EMERGENCY (EMERGENCY)
Facility: HOSPITAL | Age: 50
LOS: 1 days | Discharge: HOME | End: 2020-03-10
Attending: EMERGENCY MEDICINE
Payer: COMMERCIAL

## 2020-03-10 VITALS
RESPIRATION RATE: 18 BRPM | HEART RATE: 77 BPM | TEMPERATURE: 98 F | OXYGEN SATURATION: 98 % | SYSTOLIC BLOOD PRESSURE: 136 MMHG | WEIGHT: 229.94 LBS | HEIGHT: 69 IN | DIASTOLIC BLOOD PRESSURE: 75 MMHG

## 2020-03-10 LAB
APTT BLD: 32 SEC — SIGNIFICANT CHANGE UP (ref 27.5–36.3)
HCT VFR BLD CALC: 43.4 % — SIGNIFICANT CHANGE UP (ref 39–50)
HGB BLD-MCNC: 14.7 G/DL — SIGNIFICANT CHANGE UP (ref 13–17)
INR BLD: 1 RATIO — SIGNIFICANT CHANGE UP (ref 0.88–1.16)
MCHC RBC-ENTMCNC: 33.2 PG — SIGNIFICANT CHANGE UP (ref 27–34)
MCHC RBC-ENTMCNC: 33.9 GM/DL — SIGNIFICANT CHANGE UP (ref 32–36)
MCV RBC AUTO: 98 FL — SIGNIFICANT CHANGE UP (ref 80–100)
PLATELET # BLD AUTO: 270 K/UL — SIGNIFICANT CHANGE UP (ref 150–400)
PROTHROM AB SERPL-ACNC: 11.3 SEC — SIGNIFICANT CHANGE UP (ref 10–12.9)
RBC # BLD: 4.43 M/UL — SIGNIFICANT CHANGE UP (ref 4.2–5.8)
RBC # FLD: 12.4 % — SIGNIFICANT CHANGE UP (ref 10.3–14.5)
WBC # BLD: 7.97 K/UL — SIGNIFICANT CHANGE UP (ref 3.8–10.5)
WBC # FLD AUTO: 7.97 K/UL — SIGNIFICANT CHANGE UP (ref 3.8–10.5)

## 2020-03-10 PROCEDURE — 99283 EMERGENCY DEPT VISIT LOW MDM: CPT

## 2020-03-10 PROCEDURE — 85730 THROMBOPLASTIN TIME PARTIAL: CPT

## 2020-03-10 PROCEDURE — 85610 PROTHROMBIN TIME: CPT

## 2020-03-10 PROCEDURE — 85027 COMPLETE CBC AUTOMATED: CPT

## 2020-03-10 PROCEDURE — 36415 COLL VENOUS BLD VENIPUNCTURE: CPT

## 2020-03-10 PROCEDURE — 99284 EMERGENCY DEPT VISIT MOD MDM: CPT

## 2020-03-10 RX ORDER — OXYMETAZOLINE HYDROCHLORIDE 0.5 MG/ML
1 SPRAY NASAL ONCE
Refills: 0 | Status: COMPLETED | OUTPATIENT
Start: 2020-03-10 | End: 2020-03-10

## 2020-03-10 RX ADMIN — OXYMETAZOLINE HYDROCHLORIDE 1 SPRAY(S): 0.5 SPRAY NASAL at 17:58

## 2020-03-10 NOTE — ED PROVIDER NOTE - CARE PROVIDER_API CALL
Brian Cordoba)  Otolaryngology  [address] [place] [address], [place] [address]  Phone: [phone]  Fax: [phone]  Follow Up Time:

## 2020-03-10 NOTE — ED PROVIDER NOTE - NSFOLLOWUPINSTRUCTIONS_ED_ALL_ED_FT
1. TAKE ALL MEDICATIONS AS DIRECTED.  FOR PAIN YOU CAN TAKE ACETAMINOPHEN (TYLENOL) AS NEEDED, AS DIRECTED ON PACKAGING.  2. FOLLOW UP WITH ___ENT_______ AS DIRECTED.  YOU WERE GIVEN COPIES OF ALL LABS AND IMAGING RESULTS FROM YOUR ER VISIT--PLEASE TAKE THEM WITH YOU TO YOUR APPOINTMENT.  3. IF NEEDED, CALL [phone] TO FIND A PRIMARY CARE PHYSICIAN.  OR CALL [phone] TO MAKE AN APPOINTMENT WITH THE MEDICAL CLINIC.  4. RETURN TO THE ER FOR ANY WORSENING SYMPTOMS.    Epistaxis    Epistaxis is the medical term for a nosebleed. Nosebleeds are common and can be caused by many conditions, such as injury, infections, dry environments, medicines, nose picking, and home heating and cooling systems. Try controlling your nosebleed by pinching your nose continuously for at least 10 minutes. Avoid lying down while you are having a nosebleed. Sit up and lean forward. Avoid blowing or sniffing your nose for a number of hours after having a nosebleed. Resume your normal activities as you are able, but avoid straining, lifting, or bending at the waist for several days. Maintain humidity in your home by using less air conditioning or by using a humidifier.     If your nose was packed by your health care provider, keep the packing inside of your nose until a health care provider removes it. If a balloon catheter was used to pack your nose, do not cut or remove it unless your health care provider has instructed you to do that.     Aspirin and blood thinners make bleeding more likely. If you are prescribed these medicines and you suffer from nosebleeds, ask your health care provider if you should stop taking the medicines or adjust the dose. Do not stop medicines unless directed by your health care provider.    SEEK IMMEDIATE MEDICAL CARE IF YOU HAVE ANY OF THE FOLLOWING SYMPTOMS: nosebleed lasting longer than 20 minutes, unusual bleeding from or bruising on other parts of your body, dizziness or lightheadedness, fainting, nosebleed occurring after a head injury, or fever.

## 2020-03-10 NOTE — ED ADULT TRIAGE NOTE - CHIEF COMPLAINT QUOTE
pt c/o nose bleed yesterday and today, total 5 times. pt c/o feeling tired at this time. pt denies blood thinners. pt ambulating in triage. no bleeding at this time.

## 2020-03-10 NOTE — ED PROVIDER NOTE - PATIENT PORTAL LINK FT
You can access the FollowMyHealth Patient Portal offered by [facility] by registering at the following website: [email or web address]. By joining [facility]’s FollowMyHealth portal, you will also be able to view your health information using other applications (apps) compatible with our system.

## 2020-03-10 NOTE — ED PROVIDER NOTE - ATTENDING CONTRIBUTION TO CARE
49yoM; with pmh signif for gerd; now p/w epistaxis, now resolved. denies headache. denies lightheadedness. denies cp/sob/palp. denies n/v.  EXAM:  no active bleeding in b/l nares  A/P:  f/up with ent

## 2020-03-10 NOTE — ED PROVIDER NOTE - OBJECTIVE STATEMENT
50 yo M PMH GERD on PPI presented to ED with c/o epistaxis which started yesterday and then has had multiple episodes since. All episodes last <15min. Denies easy bruising or bleeding. Denies any blood thinners. + current recent runny nose No fevers, chills.

## 2020-04-13 NOTE — ED PROVIDER NOTE - PMH
Orders Placed This Encounter   • CBC with Automated Differential   • Comprehensive Metabolic Panel   • Glycohemoglobin   • Microalbumin Urine Random   • Lipid Panel With Reflex   • Thyroid Stimulating Hormone   • URINALYSIS & REFLEX MICRO WITH CULTURE IF INDICATED   • Drug Screen Complete, Urine         Please make lab appointment. Thank you  
Patient called and stated he would like to schedule an appointment to go over all of his medications and have his blood levels checked. Writer was not sure what type of appointment to schedule patient for because patient stated he did not want a physical. Can be reached [phone].     Patient is aware that provider is out on  leave.  
Patient is scheduled for a CPE 5/11/20, patient will walk in and have labs drawn prior to his appt in [place]. Please ensure labs are ordered so that patient may just go as a walk in to [place] lab. Thank you.   
Patient's last CPE was in April 2019.  Patient's last labs were in May 2019.  Patient can make a follow up appointment then we can go from there on what labs he would need.  Best thing would be patient making CPE lab for April or May.  
Should we schedule an office visit with labs?  
GERD (gastroesophageal reflux disease)    GERD (gastroesophageal reflux disease)    HLD (hyperlipidemia)    Lumbar disc herniation    Pre-diabetes    Sciatica

## 2020-09-18 ENCOUNTER — APPOINTMENT (OUTPATIENT)
Dept: ORTHOPEDIC SURGERY | Facility: CLINIC | Age: 50
End: 2020-09-18
Payer: MEDICAID

## 2020-09-18 VITALS
BODY MASS INDEX: 34.56 KG/M2 | DIASTOLIC BLOOD PRESSURE: 96 MMHG | SYSTOLIC BLOOD PRESSURE: 139 MMHG | HEART RATE: 76 BPM | HEIGHT: 68 IN | WEIGHT: 228 LBS

## 2020-09-18 DIAGNOSIS — Z87.19 PERSONAL HISTORY OF OTHER DISEASES OF THE DIGESTIVE SYSTEM: ICD-10-CM

## 2020-09-18 PROCEDURE — 99215 OFFICE O/P EST HI 40 MIN: CPT

## 2020-09-18 PROCEDURE — 72100 X-RAY EXAM L-S SPINE 2/3 VWS: CPT

## 2020-09-18 NOTE — PHYSICAL EXAM
[de-identified] : CONSTITUTIONAL: The patient is a very pleasant individual who is well-nourished and who appears stated age.\par PSYCHIATRIC: The patient is alert and oriented X 3 and in no apparent distress, and participates with orthopedic evaluation well.\par HEAD: Atraumatic and is nonsyndromic in appearance.\par EENT: No visible thyromegaly, EOMI.\par RESPIRATORY: Respiratory rate is regular, not dyspneic on examination.\par LYMPHATICS: There is no inguinal lymphadenopathy\par INTEGUMENTARY: Skin is clean, dry, and intact about the bilateral lower extremities and lumbar spine.\par VASCULAR: There is brisk capillary refill about the bilateral lower extremities.\par NEUROLOGIC: There are no pathologic reflexes. There is no decrease in sensation of the bilateral lower extremities on Wartenberg pinwheel/manual examination. Deep tendon reflexes are well maintained at 2+/4 of the bilateral lower extremities and are symmetric..\par MUSCULOSKELETAL: There is no visible muscular atrophy. Manual motor strength is well maintained in the bilateral lower extremities. Range of motion of lumbar spine is well maintained. The patient ambulates in a non-myelopathic manner. Negative tension sign and straight leg raise bilaterally. Quad extension, ankle dorsiflexion, EHL, plantar flexion, and ankle eversion are well preserved. Normal secondary orthopaedic exam of bilateral hips, greater trochanteric area, knees and ankles. physical exam on today's date is consistent with neurogenic claudication as well as internal derangement characteristics of the left knee patient is also suffering from mechanically orientated low back pain\par  [de-identified] : X-rays the lumbar spine of been reviewed it shows a grade 1 spondylolisthesis at L5-S1

## 2020-09-18 NOTE — DISCUSSION/SUMMARY
[de-identified] : Patient with multiple medical comorbidity increasing the risk of perioperative and postoperative complications as well as diminished spine outcomes as per the current medical literature. These include but are not limited to obesity, anxiety/depression, cardiac illness, kidney disease, peripheral vascular disease, history of cancer, COPD, dysmetabolic syndrome including but not limited to diabetes, hyperlipidemia, hypertension. Patient is being referred back to primary care provider for medical optimization, as well as other appropriate specialists as needed for optimization prior to spine surgery. \par \par patient is being sent for a lumbar MRI to help guide injection therapy. Patient is graded as an appointment his pain management specialist. I like to obtain a degree of spinal stenosis prior to allowing him to undergo a lumbar epidural injection lumbar MRI is also be useful to help guide surgical planning if the patient presents with severe spinal stenosis. If that's the case patient would be a candidate for a lumbar laminectomy at L5-S1 as well as an instrumented fusion.Patient will followup after his lumbar MRI for additional recommendations. we discussed soft tissue modalities home exercises flexion based exercises etc.

## 2020-09-18 NOTE — HISTORY OF PRESENT ILLNESS
[de-identified] : Patient presents for evaluation of low back pain. Patient was last seen in 2018 was diagnosed with an L5-S1 spondylolisthesis. Patient states his last treatment occurred approximately 2013 2014 utilization of injection therapy. Patient states he is gradually becoming worse PA-C suffering from neurogenic claudication he is forced to sit after 5-10 minutes force to lean forward etc. Patient is also suffering from worsening left knee pain. NIRAJ questionnaire is negative [Worsening] : worsening [10] : a maximum pain level of 10/10 [Prolonged Standing] : worsened by prolonged standing [Standing] : worsened by standing [Walking] : worsened by walking [Weight Bearing] : worsened by weight bearing [NSAIDs] : relieved by nonsteroidal anti-inflammatory drugs [Rest] : relieved by rest [Ataxia] : no ataxia [Incontinence] : no incontinence [Loss of Dexterity] : good dexterity [Urinary Ret.] : no urinary retention

## 2020-10-06 ENCOUNTER — TRANSCRIPTION ENCOUNTER (OUTPATIENT)
Age: 50
End: 2020-10-06

## 2020-10-07 NOTE — ED ADULT NURSE NOTE - NSFALLRSKASSESSDT_ED_ALL_ED
[facility]  Neurocritical Care Progress Note     Jose Hector Patient Status:  Inpatient    1960 MRN [id number]   [facility] 6NE-A Attending Olivia Barker MD   Hosp Day # 2 PCP Tracy Mora MD 650 MG rectal suppository 650 mg, 650 mg, Rectal, Q4H PRN    •  hydrALAzine HCl (APRESOLINE) injection 10 mg, 10 mg, Intravenous, Q2H PRN    •  aspirin 300 MG rectal suppository 300 mg, 300 mg, Rectal, Daily    Or    •  aspirin tab 325 mg, 325 mg, Oral, Da 190.0     Assesment/Plan:     Neuro:  AMS/slurred speech/weakness/numbness- s/p iv tpa.  CT/CTA/MRI all neg for acute infarct, and exam with inconsistent findings of giveaway and alternating weakness (initally on L side, now on R side) and pt with h/o psedu 02-Aug-2018 09:15

## 2020-10-09 ENCOUNTER — APPOINTMENT (OUTPATIENT)
Dept: ORTHOPEDIC SURGERY | Facility: CLINIC | Age: 50
End: 2020-10-09
Payer: MEDICAID

## 2020-10-09 VITALS
DIASTOLIC BLOOD PRESSURE: 91 MMHG | BODY MASS INDEX: 35.55 KG/M2 | WEIGHT: 240 LBS | HEART RATE: 81 BPM | SYSTOLIC BLOOD PRESSURE: 143 MMHG | HEIGHT: 69 IN

## 2020-10-09 VITALS — TEMPERATURE: 98.6 F

## 2020-10-09 DIAGNOSIS — Z87.19 PERSONAL HISTORY OF OTHER DISEASES OF THE DIGESTIVE SYSTEM: ICD-10-CM

## 2020-10-09 PROCEDURE — 99215 OFFICE O/P EST HI 40 MIN: CPT

## 2020-10-09 NOTE — ADDENDUM
[FreeTextEntry1] : Documented by Fernie Salgado acting as a scribe for Diamond Campbell  on 08/20/2020. All medical record entries made by the Scribe were at my, Diamond Campbell , direction and personally dictated by me on 08/20/2020 . I have reviewed the chart and agree that the record accurately reflects my personal performance of the history, physical exam, assessment and plan. I have also personally directed, reviewed, and agreed with the chart.

## 2020-10-09 NOTE — HISTORY OF PRESENT ILLNESS
[de-identified] : 50 year old  M Presents for evaluation of severe lower back pain and constant right sciatic pain. Patient also states he experiences RLE numbness that is constant as well. Patient has an appointment with Dr. Cespedes at pain management on October 22, 2020. No conservative treatment since around 2013, patient claims he had little to no relief with these conservative treatments when he used them previously.  [Ataxia] : no ataxia [Incontinence] : no incontinence [Loss of Dexterity] : good dexterity [Urinary Ret.] : no urinary retention

## 2020-10-09 NOTE — DISCUSSION/SUMMARY
[de-identified] : Patient has been advised to continue with his treatment with Dr. Cespedes and physiatry for injections to help manage pain. Patient was advised to follow up for repeat clinical assessment following his pain management injection. A CT scan has been ordered and is medically necessary due to persistent pain, failure of previous conservative measures, and to further progress surgical planning. MRI will help guide treatment plan, possible surgical intervention vs injection therapy with pain management.  Patient was advised to follow up for repeat clinical assessment following his pain management injection. \par \par A long discussion was had with the patient regarding Lumbar surgical plan of L5-S1 fusion with interbody with the goal of decreasing but not eliminating back pain and right leg pain. Anatomic models, Xrays, CT scans/MRI’s were utilized to provide a firm understanding of their surgical plan. Patient is aware that surgery is elective in nature and he choosing to proceed with surgery. Risks, benefits, alternatives were discussed and all questions, comments and concerns were encouraged and answered to the patient's satisfaction. The statistical probability of improvement was discussed at length as well as post surgical course. Literature from [employer] spine society was provided to the patient regarding the specific type of surgery as well as a 5 page written surgical consent which the patient will need to sign and return to the office prior to surgical date. Consent forms highlight specific complications related to the complex nature of spinal surgery\par  \par Risks of lumbar surgery include: persistent pain, adjacent segment disease (which will require more surgery in future), dural tears, neurologic injury, and wound healing complications\par  \par Benefits of lumbar surgery include Improved neurologic and pain score\par  \par We also discussed with the patient complications of incisions directly related to obesity, diabetes, previous wound complications or post-surgical wound infections, smoking, neuropathy, and chronic anticoagulation. This risk has been specifically discussed and the patient will discuss modifiable risk factors to be optimized prior to surgical management. A multimodality approach of primary care physician, and medicine subspecialist will be utilized to optimize medical risk factors.\par  \par If patient is a smoker, discontinuation of smoking was advised and must be accomplished 6-8 weeks prior to surgery date. Patient was advised that help with quitting smoking is available through [facility] [employer] Smoker's Quit Line and phone number/website was provided, or patient can ask assistance from primary care provider. Elective surgery will not be performed unless patient complies with this policy.\par \par Medical comorbidities including but not limited to diabetes, coronary artery disease, renal insufficiency, uncontrolled hypertension, rheumatoid arthritis, auto-immune disorders, COPD/asthma and/or history of radiation, chemotherapy, being on anticoagulants, chronic steroids, immune modulators, have increased statistical chance of leading to increased hospital stay, protracted recovery period, need for acute or subacute rehabilitation post-operative. There can be increased probability of post-surgical and medical complications including but not limited to surgical site infection, need for revision surgery, deep vein thrombosis, pulmonary embolism, exacerbation of COPD/asthma, pneumonia, UTI, urinary retention, and ileus. \par \par next visit flex/ex lumbar xray

## 2020-10-09 NOTE — DISCUSSION/SUMMARY
[de-identified] : Patient has been advised to continue with his treatment with Dr. Cespedes and physiatry for injections to help manage pain. Patient was advised to follow up for repeat clinical assessment following his pain management injection. A CT scan has been ordered and is medically necessary due to persistent pain, failure of previous conservative measures, and to further progress surgical planning. MRI will help guide treatment plan, possible surgical intervention vs injection therapy with pain management.  Patient was advised to follow up for repeat clinical assessment following his pain management injection. \par \par A long discussion was had with the patient regarding Lumbar surgical plan of L5-S1 fusion with interbody with the goal of decreasing but not eliminating back pain and right leg pain. Anatomic models, Xrays, CT scans/MRI’s were utilized to provide a firm understanding of their surgical plan. Patient is aware that surgery is elective in nature and he choosing to proceed with surgery. Risks, benefits, alternatives were discussed and all questions, comments and concerns were encouraged and answered to the patient's satisfaction. The statistical probability of improvement was discussed at length as well as post surgical course. Literature from [employer] spine society was provided to the patient regarding the specific type of surgery as well as a 5 page written surgical consent which the patient will need to sign and return to the office prior to surgical date. Consent forms highlight specific complications related to the complex nature of spinal surgery\par  \par Risks of lumbar surgery include: persistent pain, adjacent segment disease (which will require more surgery in future), dural tears, neurologic injury, and wound healing complications\par  \par Benefits of lumbar surgery include Improved neurologic and pain score\par  \par We also discussed with the patient complications of incisions directly related to obesity, diabetes, previous wound complications or post-surgical wound infections, smoking, neuropathy, and chronic anticoagulation. This risk has been specifically discussed and the patient will discuss modifiable risk factors to be optimized prior to surgical management. A multimodality approach of primary care physician, and medicine subspecialist will be utilized to optimize medical risk factors.\par  \par If patient is a smoker, discontinuation of smoking was advised and must be accomplished 6-8 weeks prior to surgery date. Patient was advised that help with quitting smoking is available through [facility] [employer] State Smoker's Quit Line and phone number/website was provided, or patient can ask assistance from primary care provider. Elective surgery will not be performed unless patient complies with this policy.\par \par Medical comorbidities including but not limited to diabetes, coronary artery disease, renal insufficiency, uncontrolled hypertension, rheumatoid arthritis, auto-immune disorders, COPD/asthma and/or history of radiation, chemotherapy, being on anticoagulants, chronic steroids, immune modulators, have increased statistical chance of leading to increased hospital stay, protracted recovery period, need for acute or subacute rehabilitation post-operative. There can be increased probability of post-surgical and medical complications including but not limited to surgical site infection, need for revision surgery, deep vein thrombosis, pulmonary embolism, exacerbation of COPD/asthma, pneumonia, UTI, urinary retention, and ileus. \par \par next visit flex/ex lumbar xray

## 2020-10-09 NOTE — PHYSICAL EXAM
[Poor Appearance] : well-appearing [Acute Distress] : not in acute distress [de-identified] : CONSTITUTIONAL: The patient is a very pleasant individual who is well-nourished and who appears stated age.\par PSYCHIATRIC: The patient is alert and oriented X 3 and in no apparent distress, and participates with orthopedic evaluation well.\par HEAD: Atraumatic and is nonsyndromic in appearance.\par EENT: No visible thyromegaly, EOMI.\par RESPIRATORY: Respiratory rate is regular, not dyspneic on examination.\par LYMPHATICS: There is no inguinal lymphadenopathy\par INTEGUMENTARY: Skin is clean, dry, and intact about the bilateral lower extremities and lumbar spine.\par VASCULAR: There is brisk capillary refill about the bilateral lower extremities.\par NEUROLOGIC: There are no pathologic reflexes. Deep tendon reflexes are well maintained at 2+/4 of the bilateral lower extremities and are symmetric.\par MUSCULOSKELETAL: There is no visible muscular atrophy. Manual motor strength is well maintained in the bilateral lower extremities. The patient ambulates in a non-myelopathic manner. Negative tension sign and straight leg raise bilaterally. Quad extension, ankle dorsiflexion, EHL, plantar flexion, and ankle eversion are well preserved. Normal secondary orthopaedic exam of bilateral hips, greater trochanteric area, knees and ankles \par \par Radiculopathy in an L5 distribution on the right. Severe mechanically oriented lower back pain  [de-identified] : [facility] of the lumbar spine [facility] on 9/30/2020 demonstrating spondylolisthesis L5-S1. Degenerative disc disease at L3-L4. Foraminal encroachment appreciated.

## 2020-10-09 NOTE — PHYSICAL EXAM
[Poor Appearance] : well-appearing [Acute Distress] : not in acute distress [de-identified] : CONSTITUTIONAL: The patient is a very pleasant individual who is well-nourished and who appears stated age.\par PSYCHIATRIC: The patient is alert and oriented X 3 and in no apparent distress, and participates with orthopedic evaluation well.\par HEAD: Atraumatic and is nonsyndromic in appearance.\par EENT: No visible thyromegaly, EOMI.\par RESPIRATORY: Respiratory rate is regular, not dyspneic on examination.\par LYMPHATICS: There is no inguinal lymphadenopathy\par INTEGUMENTARY: Skin is clean, dry, and intact about the bilateral lower extremities and lumbar spine.\par VASCULAR: There is brisk capillary refill about the bilateral lower extremities.\par NEUROLOGIC: There are no pathologic reflexes. Deep tendon reflexes are well maintained at 2+/4 of the bilateral lower extremities and are symmetric.\par MUSCULOSKELETAL: There is no visible muscular atrophy. Manual motor strength is well maintained in the bilateral lower extremities. The patient ambulates in a non-myelopathic manner. Negative tension sign and straight leg raise bilaterally. Quad extension, ankle dorsiflexion, EHL, plantar flexion, and ankle eversion are well preserved. Normal secondary orthopaedic exam of bilateral hips, greater trochanteric area, knees and ankles \par \par Radiculopathy in an L5 distribution on the right. Severe mechanically oriented lower back pain  [de-identified] : [facility] of the lumbar spine [facility] on 9/30/2020 demonstrating spondylolisthesis L5-S1. Degenerative disc disease at L3-L4. Foraminal encroachment appreciated.

## 2020-10-09 NOTE — HISTORY OF PRESENT ILLNESS
[de-identified] : 50 year old  M Presents for evaluation of severe lower back pain and constant right sciatic pain. Patient also states he experiences RLE numbness that is constant as well. Patient has an appointment with Dr. Cespedes at pain management on October 22, 2020. No conservative treatment since around 2013, patient claims he had little to no relief with these conservative treatments when he used them previously.  [Ataxia] : no ataxia [Incontinence] : no incontinence [Loss of Dexterity] : good dexterity [Urinary Ret.] : no urinary retention

## 2020-10-15 ENCOUNTER — OUTPATIENT (OUTPATIENT)
Dept: OUTPATIENT SERVICES | Facility: HOSPITAL | Age: 50
LOS: 1 days | End: 2020-10-15

## 2020-10-15 ENCOUNTER — APPOINTMENT (OUTPATIENT)
Dept: CT IMAGING | Facility: CLINIC | Age: 50
End: 2020-10-15
Payer: MEDICAID

## 2020-10-15 DIAGNOSIS — M43.17 SPONDYLOLISTHESIS, LUMBOSACRAL REGION: ICD-10-CM

## 2020-10-15 PROCEDURE — 72131 CT LUMBAR SPINE W/O DYE: CPT | Mod: 26

## 2020-11-12 ENCOUNTER — APPOINTMENT (OUTPATIENT)
Dept: ORTHOPEDIC SURGERY | Facility: CLINIC | Age: 50
End: 2020-11-12
Payer: MEDICAID

## 2020-11-12 DIAGNOSIS — Z87.19 PERSONAL HISTORY OF OTHER DISEASES OF THE DIGESTIVE SYSTEM: ICD-10-CM

## 2020-11-12 DIAGNOSIS — M54.5 LOW BACK PAIN: ICD-10-CM

## 2020-11-12 PROCEDURE — 99072 ADDL SUPL MATRL&STAF TM PHE: CPT

## 2020-11-12 PROCEDURE — 99215 OFFICE O/P EST HI 40 MIN: CPT

## 2020-11-12 PROCEDURE — 72100 X-RAY EXAM L-S SPINE 2/3 VWS: CPT

## 2020-11-12 NOTE — DISCUSSION/SUMMARY
[de-identified] : Based on failed physical modalities, physical therapy, injections, as well as MRIs, Xrays, and CT scans showing a L5-S1 grade 1 spondylolisthesis long discussion was had with the patient regarding Lumbar surgical plan of lumbar instrumented fusion at L5-S1 with laminectomy, interbody, and posterior lateral fusion. This surgery is meant to address his  Anatomic models, Xrays, CT scans/MRI’s were utilized to provide a firm understanding of their surgical plan. Patient is aware that surgery is elective in nature and he choosing to proceed with surgery. Risks, benefits, alternatives were discussed and all questions, comments and concerns were encouraged and answered to the patient's satisfaction. The statistical probability of improvement was discussed at length as well as post surgical course. Literature from [employer] society was provided to the patient regarding the specific type of surgery as well as a 5 page written surgical consent which the patient will need to sign and return to the office prior to surgical date. Consent forms highlight specific complications related to the complex nature of spinal surgery\par  \par Risks of lumbar surgery include: persistent pain, adjacent segment disease (which will require more surgery in future), dural tears, neurologic injury, and wound healing complications\par  \par Benefits of lumbar surgery include Improved neurologic and pain score\par  \par We also discussed with the patient complications of incisions directly related to obesity, diabetes, previous wound complications or post-surgical wound infections, smoking, neuropathy, and chronic anticoagulation. This risk has been specifically discussed and the patient will discuss modifiable risk factors to be optimized prior to surgical management. A multimodality approach of primary care physician, and medicine subspecialist will be utilized to optimize medical risk factors.\par  \par If patient is a smoker, discontinuation of smoking was advised and must be accomplished 6-8 weeks prior to surgery date. Patient was advised that help with quitting smoking is available through [place] [facility] and phone number/website was provided, or patient can ask assistance from primary care provider. Elective surgery will not be performed unless patient complies with this policy.\par \par Medical comorbidities including but not limited to diabetes, coronary artery disease, renal insufficiency, uncontrolled hypertension, rheumatoid arthritis, auto-immune disorders, COPD/asthma and/or history of radiation, chemotherapy, being on anticoagulants, chronic steroids, immune modulators, have increased statistical chance of leading to increased hospital stay, protracted recovery period, need for acute or subacute rehabilitation post-operative. There can be increased probability of post-surgical and medical complications including but not limited to surgical site infection, need for revision surgery, deep vein thrombosis, pulmonary embolism, exacerbation of COPD/asthma, pneumonia, UTI, urinary retention, and ileus.

## 2020-11-12 NOTE — PHYSICAL EXAM
[Cane] : ambulates with cane [Poor Appearance] : well-appearing [Acute Distress] : not in acute distress [de-identified] : CONSTITUTIONAL: The patient is a very pleasant individual who is well-nourished and who appears stated age.\par PSYCHIATRIC: The patient is alert and oriented X 3 and in no apparent distress, and participates with orthopedic evaluation well.\par HEAD: Atraumatic and is nonsyndromic in appearance.\par EENT: No visible thyromegaly, EOMI.\par RESPIRATORY: Respiratory rate is regular, not dyspneic on examination.\par LYMPHATICS: There is no inguinal lymphadenopathy\par INTEGUMENTARY: Skin is clean, dry, and intact about the bilateral lower extremities and lumbar spine.\par VASCULAR: There is brisk capillary refill about the bilateral lower extremities.\par NEUROLOGIC: There are no pathologic reflexes. There is no decrease in sensation of the bilateral lower extremities on Wartenberg pinwheel examination. Deep tendon reflexes are well maintained at 2+/4 of the bilateral lower extremities and are symmetric.\par MUSCULOSKELETAL: There is no visible muscular atrophy. Manual motor strength is well maintained in the bilateral lower extremities. The patient ambulates in a non-myelopathic manner. Negative tension sign and straight leg raise bilaterally. Quad extension, ankle dorsiflexion, EHL, plantar flexion, and ankle eversion are well preserved. Normal secondary orthopaedic exam of bilateral hips, greater trochanteric area, knees and ankles. \par Exam consistent with neurogenic claudication, mechanically oriented lower back pain, patient seated in a forward flexed position stating it helps his pain.  [de-identified] : MRI of the lumbar spine taken at [facility] in 09/2020 demonstrates a spondylolisthesis at L5-S1 that is grade one, endstage degenerative disc disease at L3-L4, moderate stenosis at L3-L4, and expected severe foraminal stenosis at L5-S1. \par \par CT scan from [facility] done on 10/15/2020 demonstrates L5-S1 spondylolisthesis, severe foraminal compromise, N2 gas formation in the L5-S1 space. \par \par Xray of the lumbar spine taken 11/12/2020 demonstrates a grade 1 spondylolisthesis at L5-S1, mild disc degeneration at L3-L4

## 2020-11-12 NOTE — HISTORY OF PRESENT ILLNESS
[de-identified] : 50 year old M Presents for follow up evaluation of LLE pain with mild numbness in the left lateral portion thigh as well as in one of left toes. The patient describes signs and symptoms consistent with neurogenic claudication. He states he can only walk 25 to 30 steps before he has to rest. He describes the pain as a burning sensation. He states with forward flexion there is some relief of his back pain but when he stands straight up the pain immediately returns, forward flexion does not effect his LLE pain and numbness. His last injection was [id number] and did not alleviate pain to an acceptable degree, he completed PT in 2018 and states that while at PT he felt relief however the following day the pain would return to the same level. He states medications provided by Dr. Cespedes did not alleviate the pain. Patient states he is in intractable pain and his quality of life is suffering.  [Ataxia] : no ataxia [Incontinence] : no incontinence [Loss of Dexterity] : good dexterity [Urinary Ret.] : no urinary retention

## 2020-11-12 NOTE — ADDENDUM
[FreeTextEntry1] : Documented by Fernie Salgado acting as a scribe for Dr. Jassi Khalil on 11/12/2020. All medical record entries made by the Scribe were at my, Dr. Jassi Khalil, direction and personally dictated by me on 11/12/2020 . I have reviewed the chart and agree that the record accurately reflects my personal performance of the history, physical exam, assessment and plan. I have also personally directed, reviewed, and agreed with the chart.

## 2020-12-02 ENCOUNTER — OUTPATIENT (OUTPATIENT)
Dept: OUTPATIENT SERVICES | Facility: HOSPITAL | Age: 50
LOS: 1 days | End: 2020-12-02
Payer: COMMERCIAL

## 2020-12-02 VITALS
HEIGHT: 69 IN | HEART RATE: 78 BPM | TEMPERATURE: 97 F | WEIGHT: 232.59 LBS | DIASTOLIC BLOOD PRESSURE: 68 MMHG | RESPIRATION RATE: 18 BRPM | SYSTOLIC BLOOD PRESSURE: 118 MMHG

## 2020-12-02 DIAGNOSIS — Z29.9 ENCOUNTER FOR PROPHYLACTIC MEASURES, UNSPECIFIED: ICD-10-CM

## 2020-12-02 DIAGNOSIS — M43.17 SPONDYLOLISTHESIS, LUMBOSACRAL REGION: ICD-10-CM

## 2020-12-02 DIAGNOSIS — E11.9 TYPE 2 DIABETES MELLITUS WITHOUT COMPLICATIONS: ICD-10-CM

## 2020-12-02 DIAGNOSIS — Z01.818 ENCOUNTER FOR OTHER PREPROCEDURAL EXAMINATION: ICD-10-CM

## 2020-12-02 LAB
A1C WITH ESTIMATED AVERAGE GLUCOSE RESULT: 6.4 % — HIGH (ref 4–5.6)
ANION GAP SERPL CALC-SCNC: 10 MMOL/L — SIGNIFICANT CHANGE UP (ref 5–17)
APTT BLD: 31.7 SEC — SIGNIFICANT CHANGE UP (ref 27.5–35.5)
BASOPHILS # BLD AUTO: 0.06 K/UL — SIGNIFICANT CHANGE UP (ref 0–0.2)
BASOPHILS NFR BLD AUTO: 0.8 % — SIGNIFICANT CHANGE UP (ref 0–2)
BLD GP AB SCN SERPL QL: SIGNIFICANT CHANGE UP
BUN SERPL-MCNC: 11 MG/DL — SIGNIFICANT CHANGE UP (ref 8–20)
CALCIUM SERPL-MCNC: 9.2 MG/DL — SIGNIFICANT CHANGE UP (ref 8.6–10.2)
CHLORIDE SERPL-SCNC: 105 MMOL/L — SIGNIFICANT CHANGE UP (ref 98–107)
CO2 SERPL-SCNC: 23 MMOL/L — SIGNIFICANT CHANGE UP (ref 22–29)
CREAT SERPL-MCNC: 1.06 MG/DL — SIGNIFICANT CHANGE UP (ref 0.5–1.3)
EOSINOPHIL # BLD AUTO: 0.24 K/UL — SIGNIFICANT CHANGE UP (ref 0–0.5)
EOSINOPHIL NFR BLD AUTO: 3.4 % — SIGNIFICANT CHANGE UP (ref 0–6)
ESTIMATED AVERAGE GLUCOSE: 137 MG/DL — HIGH (ref 68–114)
GLUCOSE SERPL-MCNC: 111 MG/DL — HIGH (ref 70–99)
HCT VFR BLD CALC: 46.5 % — SIGNIFICANT CHANGE UP (ref 39–50)
HGB BLD-MCNC: 15.3 G/DL — SIGNIFICANT CHANGE UP (ref 13–17)
IMM GRANULOCYTES NFR BLD AUTO: 0.3 % — SIGNIFICANT CHANGE UP (ref 0–1.5)
INR BLD: 0.97 RATIO — SIGNIFICANT CHANGE UP (ref 0.88–1.16)
LYMPHOCYTES # BLD AUTO: 2.41 K/UL — SIGNIFICANT CHANGE UP (ref 1–3.3)
LYMPHOCYTES # BLD AUTO: 34 % — SIGNIFICANT CHANGE UP (ref 13–44)
MCHC RBC-ENTMCNC: 31.4 PG — SIGNIFICANT CHANGE UP (ref 27–34)
MCHC RBC-ENTMCNC: 32.9 GM/DL — SIGNIFICANT CHANGE UP (ref 32–36)
MCV RBC AUTO: 95.5 FL — SIGNIFICANT CHANGE UP (ref 80–100)
MONOCYTES # BLD AUTO: 0.74 K/UL — SIGNIFICANT CHANGE UP (ref 0–0.9)
MONOCYTES NFR BLD AUTO: 10.4 % — SIGNIFICANT CHANGE UP (ref 2–14)
MRSA PCR RESULT.: SIGNIFICANT CHANGE UP
NEUTROPHILS # BLD AUTO: 3.62 K/UL — SIGNIFICANT CHANGE UP (ref 1.8–7.4)
NEUTROPHILS NFR BLD AUTO: 51.1 % — SIGNIFICANT CHANGE UP (ref 43–77)
PLATELET # BLD AUTO: 309 K/UL — SIGNIFICANT CHANGE UP (ref 150–400)
POTASSIUM SERPL-MCNC: 4.1 MMOL/L — SIGNIFICANT CHANGE UP (ref 3.5–5.3)
POTASSIUM SERPL-SCNC: 4.1 MMOL/L — SIGNIFICANT CHANGE UP (ref 3.5–5.3)
PROTHROM AB SERPL-ACNC: 11.3 SEC — SIGNIFICANT CHANGE UP (ref 10.6–13.6)
RBC # BLD: 4.87 M/UL — SIGNIFICANT CHANGE UP (ref 4.2–5.8)
RBC # FLD: 12.8 % — SIGNIFICANT CHANGE UP (ref 10.3–14.5)
S AUREUS DNA NOSE QL NAA+PROBE: SIGNIFICANT CHANGE UP
SODIUM SERPL-SCNC: 138 MMOL/L — SIGNIFICANT CHANGE UP (ref 135–145)
WBC # BLD: 7.09 K/UL — SIGNIFICANT CHANGE UP (ref 3.8–10.5)
WBC # FLD AUTO: 7.09 K/UL — SIGNIFICANT CHANGE UP (ref 3.8–10.5)

## 2020-12-02 PROCEDURE — 93010 ELECTROCARDIOGRAM REPORT: CPT

## 2020-12-02 PROCEDURE — G0463: CPT

## 2020-12-02 PROCEDURE — 93005 ELECTROCARDIOGRAM TRACING: CPT

## 2020-12-02 RX ORDER — PANTOPRAZOLE SODIUM 20 MG/1
0 TABLET, DELAYED RELEASE ORAL
Qty: 0 | Refills: 0 | DISCHARGE

## 2020-12-02 NOTE — H&P PST ADULT - NSICDXPROBLEM_GEN_ALL_CORE_FT
PROBLEM DIAGNOSES  Problem: Spondylolisthesis, lumbosacral region  Assessment and Plan: f/u with cardiologist and PCP for clearance for L5-S1 instructed fusion interbody and pedicle screws     Problem: DM (diabetes mellitus)  Assessment and Plan: f/u with PCP fro medical clearance     Problem: Need for prophylactic measure  Assessment and Plan: Moderate risk surgical team to determine prophylactic intervention

## 2020-12-02 NOTE — H&P PST ADULT - HISTORY OF PRESENT ILLNESS
49 y/o with DM Type 2, chronic back pain, GERD,  OA, lumbar herniated disc, seen today pre-op for L5-S1 Instructed fusion interbody and pedicle screws for spondylolisthesis, lumbosacral region. Pt report lower back pain that radiates to bilateral lower extremities pain, left leg pain worse with ambulation, described pain as burning sensation that radiates from back to bilateral thigh, legs, intermittent numbness and tingling of both legs. 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 Pt report lower back pain started around 2013, pain has progressively gotten worse for the past two years and  became unbearable since March of this year.    Report prior conversational treatment of physical therapy and pain management with steroid injections 3x with poor efficacy. Pt requires thr use of a cane for mobility, report pain sometimes relieved with rest, muscle relaxant and constant reposition. Seen today for a scheduled surgery with Dr. Khalil on 12/16/2020.

## 2020-12-02 NOTE — PATIENT PROFILE ADULT - NSPROHMSYMPCOND_GEN_A_NUR
pre-op wash, MRSA/MSSA & covid testing reviewed . Patient states he received spine booklet from office . Denies any questions at this time

## 2020-12-02 NOTE — H&P PST ADULT - NSICDXFAMILYHX_GEN_ALL_CORE_FT
FAMILY HISTORY:  Father  Still living? Yes, Estimated age: Age Unknown  FH: diabetes mellitus, Age at diagnosis: Age Unknown    Mother  Still living? Yes, Estimated age: Age Unknown  FH: diabetes mellitus, Age at diagnosis: Age Unknown  FHx: heart disease, Age at diagnosis: Age Unknown

## 2020-12-02 NOTE — H&P PST ADULT - NSHPATTENDINGPLANDISCUSS_GEN_ALL_CORE
Patient. Patient is aware of adjacent level diseaserevision fusion in complete resolution of signs and symptoms durotomy neurologic issue. Based upon neurogenic claudication from entrapment of the L. V nerve roots patient wishes to pursue L5-S1 instrumented fusion with interbody.

## 2020-12-02 NOTE — H&P PST ADULT - NSICDXPASTMEDICALHX_GEN_ALL_CORE_FT
PAST MEDICAL HISTORY:  Arthritis     DM (diabetes mellitus)     GERD (gastroesophageal reflux disease)     HLD (hyperlipidemia)     Lumbar disc herniation     Sciatica     Spondylolisthesis, lumbosacral region      PAST MEDICAL HISTORY:  Arthritis     At risk for sleep apnea     DM (diabetes mellitus)     GERD (gastroesophageal reflux disease)     History of syncope episode 6 years ago, follow by [facility] cardiologist    HLD (hyperlipidemia)     Lumbar disc herniation     Sciatica     Spondylolisthesis, lumbosacral region

## 2020-12-02 NOTE — H&P PST ADULT - ASSESSMENT
49 y/o with DM Type 2, chronic back pain, GERD,  OA, lumbar herniated disc, seen today pre-op for L5-S1 Instructed fusion interbody and pedicle screws for spondylolisthesis, lumbosacral region. Pt report lower back pain that radiates to bilateral lower extremities pain, left leg pain worse with ambulation, described pain as burning sensation that radiates from back to bilateral thigh, legs, intermittent numbness and tingling of both legs. 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 Pt report lower back pain started around , pain has progressively gotten worse for the past two years and  became unbearable since March of this year.    Report prior conversational treatment of physical therapy and pain management with steroid injections 3x with poor efficacy. Pt requires thr use of a cane for mobility, report pain sometimes relieved with rest, muscle relaxant and constant reposition. Seen today for a scheduled surgery with Dr. Khalil on 2020. Surgery protocol reviewed with pt today, pt already scheduled his appointment with his PCP and cardiologist for clearances  CAPRINI VTE 2.0 SCORE [CLOT updated 2019]    AGE RELATED RISK FACTORS                                                       MOBILITY RELATED FACTORS  [x ] Age 41-60 years                                            (1 Point)                    [ ] Bed rest                                                        (1 Point)  [ ] Age: 61-74 years                                           (2 Points)                  [ ] Plaster cast                                                   (2 Points)  [ ] Age= 75 years                                              (3 Points)                    [ ] Bed bound for more than 72 hours                 (2 Points)    DISEASE RELATED RISK FACTORS                                               GENDER SPECIFIC FACTORS  [ ] Edema in the lower extremities                       (1 Point)              [ ] Pregnancy                                                     (1 Point)  [ ] Varicose veins                                               (1 Point)                     [ ] Post-partum < 6 weeks                                   (1 Point)             [x ] BMI > 25 Kg/m2                                            (1 Point)                     [ ] Hormonal therapy  or oral contraception          (1 Point)                 [ ] Sepsis (in the previous month)                        (1 Point)               [ ] History of pregnancy complications                 (1 point)  [ ] Pneumonia or serious lung disease                                               [ ] Unexplained or recurrent                     (1 Point)           (in the previous month)                               (1 Point)  [ ] Abnormal pulmonary function test                     (1 Point)                 SURGERY RELATED RISK FACTORS  [ ] Acute myocardial infarction                              (1 Point)               [ ]  Section                                             (1 Point)  [ ] Congestive heart failure (in the previous month)  (1 Point)      [ ] Minor surgery                                                  (1 Point)   [ ] Inflammatory bowel disease                             (1 Point)               [x ] Arthroscopic surgery                                        (2 Points)  [ ] Central venous access                                      (2 Points)                [ ] General surgery lasting more than 45 minutes (2 points)  [ ] Malignancy- Present or previous                   (2 Points)                [ ] Elective arthroplasty                                         (5 points)    [ ] Stroke (in the previous month)                          (5 Points)                                                                                                                                                           HEMATOLOGY RELATED FACTORS                                                 TRAUMA RELATED RISK FACTORS  [ ] Prior episodes of VTE                                     (3 Points)                [ ] Fracture of the hip, pelvis, or leg                       (5 Points)  [ ] Positive family history for VTE                         (3 Points)             [ ] Acute spinal cord injury (in the previous month)  (5 Points)  [ ] Prothrombin 29563 A                                     (3 Points)               [ ] Paralysis  (less than 1 month)                             (5 Points)  [ ] Factor V Leiden                                             (3 Points)                  [ ] Multiple Trauma within 1 month                        (5 Points)  [ ] Lupus anticoagulants                                     (3 Points)                                                           [ ] Anticardiolipin antibodies                               (3 Points)                                                       [ ] High homocysteine in the blood                      (3 Points)                                             [ ] Other congenital or acquired thrombophilia      (3 Points)                                                [ ] Heparin induced thrombocytopenia                  (3 Points)                                     Total Score [  4        ]    OPIOID RISK TOOL    ROYAL EACH BOX THAT APPLIES AND ADD TOTALS AT THE END    FAMILY HISTORY OF SUBSTANCE ABUSE                 FEMALE         MALE                                                Alcohol                             [  ]1 pt          [  ]3pts                                               Illegal Durgs                     [  ]2 pts        [  ]3pts                                               Rx Drugs                           [  ]4 pts        [  ]4 pts    PERSONAL HISTORY OF SUBSTANCE ABUSE                                                                                          Alcohol                             [  ]3 pts       [  ]3 pts                                               Illegal Drugs                     [  ]4 pts        [  ]4 pts                                               Rx Drugs                           [  ]5 pts        [  ]5 pts    AGE BETWEEN 16-45 YEARS                                      [  ]1 pt         [  ]1 pt    HISTORY OF PREADOLESCENT   SEXUAL ABUSE                                                             [  ]3 pts        [  ]0pts    PSYCHOLOGICAL DISEASE                     ADD, OCD, Bipolar, Schizophrenia        [  ]2 pts         [  ]2 pts                      Depression                                               [  ]1 pt           [  ]1 pt           SCORING TOTAL   (add numbers and type here)              (**0*)                                     A score of 3 or lower indicated LOW risk for future opioid abuse  A score of 4 to 7 indicated moderate risk for future opioid abuse  A score of 8 or higher indicates a high risk for opioid abuse

## 2020-12-04 ENCOUNTER — NON-APPOINTMENT (OUTPATIENT)
Age: 50
End: 2020-12-04

## 2020-12-04 ENCOUNTER — APPOINTMENT (OUTPATIENT)
Dept: CARDIOLOGY | Facility: CLINIC | Age: 50
End: 2020-12-04
Payer: MEDICAID

## 2020-12-04 VITALS
DIASTOLIC BLOOD PRESSURE: 81 MMHG | TEMPERATURE: 98.3 F | WEIGHT: 237 LBS | SYSTOLIC BLOOD PRESSURE: 126 MMHG | BODY MASS INDEX: 35.1 KG/M2 | HEART RATE: 78 BPM | HEIGHT: 69 IN | OXYGEN SATURATION: 96 %

## 2020-12-04 DIAGNOSIS — Z01.810 ENCOUNTER FOR PREPROCEDURAL CARDIOVASCULAR EXAMINATION: ICD-10-CM

## 2020-12-04 PROCEDURE — 99072 ADDL SUPL MATRL&STAF TM PHE: CPT

## 2020-12-04 PROCEDURE — 93000 ELECTROCARDIOGRAM COMPLETE: CPT

## 2020-12-04 PROCEDURE — 99214 OFFICE O/P EST MOD 30 MIN: CPT

## 2020-12-04 RX ORDER — PANTOPRAZOLE 40 MG/1
40 TABLET, DELAYED RELEASE ORAL DAILY
Refills: 0 | Status: DISCONTINUED | COMMUNITY
Start: 2018-08-01 | End: 2020-12-04

## 2020-12-04 RX ORDER — NAPROXEN 500 MG/1
500 TABLET ORAL
Qty: 60 | Refills: 1 | Status: DISCONTINUED | COMMUNITY
Start: 2020-09-18 | End: 2020-12-04

## 2020-12-04 RX ORDER — ESOMEPRAZOLE MAGNESIUM 40 MG/1
CAPSULE, DELAYED RELEASE ORAL
Refills: 0 | Status: DISCONTINUED | COMMUNITY
End: 2020-12-04

## 2020-12-04 RX ORDER — KETOROLAC TROMETHAMINE 10 MG/1
10 TABLET, FILM COATED ORAL 3 TIMES DAILY
Qty: 15 | Refills: 0 | Status: DISCONTINUED | COMMUNITY
Start: 2018-12-13 | End: 2020-12-04

## 2020-12-04 NOTE — HISTORY OF PRESENT ILLNESS
[FreeTextEntry1] : Patient is a 50-year-old  male with a past medical history of diet  diabetes mellitus type II,  former smoker presents . Patient has no prior history for CAD, CHF or cardiac arrhythmias.  No history of TIA or CVA.  No CKD.\par \par Patient now presents for a preoperative cardiac risk assessment prior to undergoing back surgery.  Patient was seen by me in 2018 he had a normal echocardiogram and a normal stress test.  Patient currently denies any chest pain, dyspnea, palpitations or any symptoms suggestive of angina or CHF.  Patient denies orthopnea, PND or leg edema.  Patient reports no change in his interval medical history since last visit.  Patient is unable to climb stairs or walk because of back pain.\par

## 2020-12-04 NOTE — ASSESSMENT
[FreeTextEntry1] : EKG: NSR, No acute ST/T changes\par Echocardiogram August 2018: LVEF 60 to 65%.  Normal echo\par Nuclear stress test August 13, 2018: Normal study.\par EKG 12/4/2020- Sinus  Rhythm \par -  Nonspecific T-abnormality in leads 3 and avF.  This EKG unchanged compared to prior EKG.\par \par Assessment:\par 1.  Type 2 diabetes mellitus\par 2.  Back pain\par 3.  Preop cardiac risk assessment\par \par Recommendations:\par \par Patient has no complaints suggestive of angina or CHF.\par Patient is low risk for perioperative cardiac events.\par NO ABSOLUTE CONTRAINDICATIONS TO PROCEED WITH BACK SURGERY.\par NO FURTHER CARDIAC WORK UP IS NECESSARY.\par \par F/u PRN\par \par Recommendations:\par 1. ECHO/Nuclear Stress Test/Event monitor (30 days)\par 2. F/U in 2 months\par \par \par Above recommendations were discussed with the patient and he verbalized his understanding.\par

## 2020-12-04 NOTE — PHYSICAL EXAM
[General Appearance - Well Developed] : well developed [Normal Appearance] : normal appearance [Well Groomed] : well groomed [General Appearance - Well Nourished] : well nourished [No Deformities] : no deformities [General Appearance - In No Acute Distress] : no acute distress [Normal Conjunctiva] : the conjunctiva exhibited no abnormalities [Eyelids - No Xanthelasma] : the eyelids demonstrated no xanthelasmas [Normal Oral Mucosa] : normal oral mucosa [No Oral Pallor] : no oral pallor [No Oral Cyanosis] : no oral cyanosis [Normal Jugular Venous A Waves Present] : normal jugular venous A waves present [Normal Jugular Venous V Waves Present] : normal jugular venous V waves present [No Jugular Venous Cerrato A Waves] : no jugular venous cerrato A waves [Respiration, Rhythm And Depth] : normal respiratory rhythm and effort [Exaggerated Use Of Accessory Muscles For Inspiration] : no accessory muscle use [Auscultation Breath Sounds / Voice Sounds] : lungs were clear to auscultation bilaterally [Heart Rate And Rhythm] : heart rate and rhythm were normal [Heart Sounds] : normal S1 and S2 [Murmurs] : no murmurs present [Abdomen Soft] : soft [Abdomen Tenderness] : non-tender [Abdomen Mass (___ Cm)] : no abdominal mass palpated [Abnormal Walk] : normal gait [Gait - Sufficient For Exercise Testing] : the gait was sufficient for exercise testing [Nail Clubbing] : no clubbing of the fingernails [Cyanosis, Localized] : no localized cyanosis [Petechial Hemorrhages (___cm)] : no petechial hemorrhages [Skin Color & Pigmentation] : normal skin color and pigmentation [] : no rash [No Venous Stasis] : no venous stasis [Skin Lesions] : no skin lesions [No Skin Ulcers] : no skin ulcer [No Xanthoma] : no  xanthoma was observed [Oriented To Time, Place, And Person] : oriented to person, place, and time [Affect] : the affect was normal [Mood] : the mood was normal [No Anxiety] : not feeling anxious

## 2020-12-04 NOTE — REASON FOR VISIT
[Follow-Up - Clinic] : a clinic follow-up of [FreeTextEntry1] : Preoperative cardiac evaluation prior to back surgery.

## 2020-12-13 ENCOUNTER — APPOINTMENT (OUTPATIENT)
Dept: DISASTER EMERGENCY | Facility: CLINIC | Age: 50
End: 2020-12-13

## 2020-12-15 ENCOUNTER — TRANSCRIPTION ENCOUNTER (OUTPATIENT)
Age: 50
End: 2020-12-15

## 2020-12-15 LAB — SARS-COV-2 N GENE NPH QL NAA+PROBE: NOT DETECTED

## 2020-12-16 ENCOUNTER — APPOINTMENT (OUTPATIENT)
Dept: ORTHOPEDIC SURGERY | Facility: HOSPITAL | Age: 50
End: 2020-12-16

## 2020-12-16 ENCOUNTER — INPATIENT (INPATIENT)
Facility: HOSPITAL | Age: 50
LOS: 1 days | Discharge: HOME | DRG: 454 | End: 2020-12-18
Attending: ORTHOPAEDIC SURGERY | Admitting: ORTHOPAEDIC SURGERY
Payer: COMMERCIAL

## 2020-12-16 ENCOUNTER — TRANSCRIPTION ENCOUNTER (OUTPATIENT)
Age: 50
End: 2020-12-16

## 2020-12-16 VITALS
HEART RATE: 67 BPM | DIASTOLIC BLOOD PRESSURE: 93 MMHG | OXYGEN SATURATION: 100 % | HEIGHT: 69 IN | WEIGHT: 232.59 LBS | RESPIRATION RATE: 17 BRPM | TEMPERATURE: 98 F | SYSTOLIC BLOOD PRESSURE: 145 MMHG

## 2020-12-16 DIAGNOSIS — M43.17 SPONDYLOLISTHESIS, LUMBOSACRAL REGION: ICD-10-CM

## 2020-12-16 DIAGNOSIS — K21.9 GASTRO-ESOPHAGEAL REFLUX DISEASE WITHOUT ESOPHAGITIS: ICD-10-CM

## 2020-12-16 LAB
ABO RH CONFIRMATION: SIGNIFICANT CHANGE UP
GLUCOSE BLDC GLUCOMTR-MCNC: 113 MG/DL — HIGH (ref 70–99)
GLUCOSE BLDC GLUCOMTR-MCNC: 140 MG/DL — HIGH (ref 70–99)
GLUCOSE BLDC GLUCOMTR-MCNC: 141 MG/DL — HIGH (ref 70–99)
GLUCOSE BLDC GLUCOMTR-MCNC: 223 MG/DL — HIGH (ref 70–99)

## 2020-12-16 PROCEDURE — 99222 1ST HOSP IP/OBS MODERATE 55: CPT

## 2020-12-16 PROCEDURE — 22633 ARTHRD CMBN 1NTRSPC LUMBAR: CPT

## 2020-12-16 PROCEDURE — 22853 INSJ BIOMECHANICAL DEVICE: CPT | Mod: AS

## 2020-12-16 PROCEDURE — 22853 INSJ BIOMECHANICAL DEVICE: CPT

## 2020-12-16 PROCEDURE — 22840 INSERT SPINE FIXATION DEVICE: CPT

## 2020-12-16 PROCEDURE — 22840 INSERT SPINE FIXATION DEVICE: CPT | Mod: AS

## 2020-12-16 PROCEDURE — 22633 ARTHRD CMBN 1NTRSPC LUMBAR: CPT | Mod: AS

## 2020-12-16 RX ORDER — DEXTROSE 50 % IN WATER 50 %
25 SYRINGE (ML) INTRAVENOUS ONCE
Refills: 0 | Status: DISCONTINUED | OUTPATIENT
Start: 2020-12-16 | End: 2020-12-18

## 2020-12-16 RX ORDER — GLUCAGON INJECTION, SOLUTION 0.5 MG/.1ML
1 INJECTION, SOLUTION SUBCUTANEOUS ONCE
Refills: 0 | Status: DISCONTINUED | OUTPATIENT
Start: 2020-12-16 | End: 2020-12-18

## 2020-12-16 RX ORDER — SODIUM CHLORIDE 9 MG/ML
1000 INJECTION, SOLUTION INTRAVENOUS
Refills: 0 | Status: DISCONTINUED | OUTPATIENT
Start: 2020-12-16 | End: 2020-12-16

## 2020-12-16 RX ORDER — DIAZEPAM 5 MG
2 TABLET ORAL
Refills: 0 | Status: DISCONTINUED | OUTPATIENT
Start: 2020-12-16 | End: 2020-12-18

## 2020-12-16 RX ORDER — ACETAMINOPHEN 500 MG
975 TABLET ORAL EVERY 6 HOURS
Refills: 0 | Status: DISCONTINUED | OUTPATIENT
Start: 2020-12-16 | End: 2020-12-16

## 2020-12-16 RX ORDER — DEXTROSE 50 % IN WATER 50 %
12.5 SYRINGE (ML) INTRAVENOUS ONCE
Refills: 0 | Status: DISCONTINUED | OUTPATIENT
Start: 2020-12-16 | End: 2020-12-18

## 2020-12-16 RX ORDER — CELECOXIB 200 MG/1
200 CAPSULE ORAL
Refills: 0 | Status: DISCONTINUED | OUTPATIENT
Start: 2020-12-17 | End: 2020-12-18

## 2020-12-16 RX ORDER — CEFAZOLIN SODIUM 1 G
2000 VIAL (EA) INJECTION
Refills: 0 | Status: COMPLETED | OUTPATIENT
Start: 2020-12-16 | End: 2020-12-16

## 2020-12-16 RX ORDER — HYDROMORPHONE HYDROCHLORIDE 2 MG/ML
0.5 INJECTION INTRAMUSCULAR; INTRAVENOUS; SUBCUTANEOUS
Refills: 0 | Status: DISCONTINUED | OUTPATIENT
Start: 2020-12-16 | End: 2020-12-16

## 2020-12-16 RX ORDER — APREPITANT 80 MG/1
40 CAPSULE ORAL ONCE
Refills: 0 | Status: COMPLETED | OUTPATIENT
Start: 2020-12-16 | End: 2020-12-16

## 2020-12-16 RX ORDER — CELECOXIB 200 MG/1
200 CAPSULE ORAL ONCE
Refills: 0 | Status: COMPLETED | OUTPATIENT
Start: 2020-12-16 | End: 2020-12-16

## 2020-12-16 RX ORDER — INSULIN LISPRO 100/ML
VIAL (ML) SUBCUTANEOUS
Refills: 0 | Status: DISCONTINUED | OUTPATIENT
Start: 2020-12-16 | End: 2020-12-18

## 2020-12-16 RX ORDER — ACETAMINOPHEN 500 MG
975 TABLET ORAL EVERY 6 HOURS
Refills: 0 | Status: DISCONTINUED | OUTPATIENT
Start: 2020-12-16 | End: 2020-12-18

## 2020-12-16 RX ORDER — GABAPENTIN 400 MG/1
300 CAPSULE ORAL
Refills: 0 | Status: DISCONTINUED | OUTPATIENT
Start: 2020-12-17 | End: 2020-12-18

## 2020-12-16 RX ORDER — DEXTROSE 50 % IN WATER 50 %
15 SYRINGE (ML) INTRAVENOUS ONCE
Refills: 0 | Status: DISCONTINUED | OUTPATIENT
Start: 2020-12-16 | End: 2020-12-18

## 2020-12-16 RX ORDER — SODIUM CHLORIDE 9 MG/ML
1000 INJECTION, SOLUTION INTRAVENOUS
Refills: 0 | Status: DISCONTINUED | OUTPATIENT
Start: 2020-12-16 | End: 2020-12-18

## 2020-12-16 RX ORDER — ONDANSETRON 8 MG/1
4 TABLET, FILM COATED ORAL EVERY 6 HOURS
Refills: 0 | Status: DISCONTINUED | OUTPATIENT
Start: 2020-12-16 | End: 2020-12-18

## 2020-12-16 RX ORDER — ONDANSETRON 8 MG/1
4 TABLET, FILM COATED ORAL ONCE
Refills: 0 | Status: DISCONTINUED | OUTPATIENT
Start: 2020-12-16 | End: 2020-12-16

## 2020-12-16 RX ORDER — CEFAZOLIN SODIUM 1 G
2000 VIAL (EA) INJECTION ONCE
Refills: 0 | Status: DISCONTINUED | OUTPATIENT
Start: 2020-12-16 | End: 2020-12-16

## 2020-12-16 RX ORDER — PANTOPRAZOLE SODIUM 20 MG/1
40 TABLET, DELAYED RELEASE ORAL
Refills: 0 | Status: DISCONTINUED | OUTPATIENT
Start: 2020-12-16 | End: 2020-12-18

## 2020-12-16 RX ORDER — SODIUM CHLORIDE 9 MG/ML
500 INJECTION INTRAMUSCULAR; INTRAVENOUS; SUBCUTANEOUS ONCE
Refills: 0 | Status: COMPLETED | OUTPATIENT
Start: 2020-12-16 | End: 2020-12-16

## 2020-12-16 RX ORDER — MAGNESIUM HYDROXIDE 400 MG/1
30 TABLET, CHEWABLE ORAL EVERY 12 HOURS
Refills: 0 | Status: DISCONTINUED | OUTPATIENT
Start: 2020-12-16 | End: 2020-12-18

## 2020-12-16 RX ORDER — SENNA PLUS 8.6 MG/1
2 TABLET ORAL AT BEDTIME
Refills: 0 | Status: DISCONTINUED | OUTPATIENT
Start: 2020-12-16 | End: 2020-12-18

## 2020-12-16 RX ORDER — ASPIRIN/CALCIUM CARB/MAGNESIUM 324 MG
325 TABLET ORAL DAILY
Refills: 0 | Status: DISCONTINUED | OUTPATIENT
Start: 2020-12-17 | End: 2020-12-18

## 2020-12-16 RX ORDER — SODIUM CHLORIDE 9 MG/ML
3 INJECTION INTRAMUSCULAR; INTRAVENOUS; SUBCUTANEOUS EVERY 8 HOURS
Refills: 0 | Status: DISCONTINUED | OUTPATIENT
Start: 2020-12-16 | End: 2020-12-16

## 2020-12-16 RX ORDER — FENTANYL CITRATE 50 UG/ML
30 INJECTION INTRAVENOUS
Refills: 0 | Status: DISCONTINUED | OUTPATIENT
Start: 2020-12-16 | End: 2020-12-17

## 2020-12-16 RX ORDER — SODIUM CHLORIDE 9 MG/ML
1000 INJECTION, SOLUTION INTRAVENOUS
Refills: 0 | Status: DISCONTINUED | OUTPATIENT
Start: 2020-12-16 | End: 2020-12-17

## 2020-12-16 RX ORDER — BUPIVACAINE 13.3 MG/ML
20 INJECTION, SUSPENSION, LIPOSOMAL INFILTRATION ONCE
Refills: 0 | Status: DISCONTINUED | OUTPATIENT
Start: 2020-12-16 | End: 2020-12-16

## 2020-12-16 RX ORDER — METHOCARBAMOL 500 MG/1
750 TABLET, FILM COATED ORAL THREE TIMES A DAY
Refills: 0 | Status: DISCONTINUED | OUTPATIENT
Start: 2020-12-16 | End: 2020-12-18

## 2020-12-16 RX ADMIN — HYDROMORPHONE HYDROCHLORIDE 0.5 MILLIGRAM(S): 2 INJECTION INTRAMUSCULAR; INTRAVENOUS; SUBCUTANEOUS at 13:31

## 2020-12-16 RX ADMIN — SODIUM CHLORIDE 1000 MILLILITER(S): 9 INJECTION INTRAMUSCULAR; INTRAVENOUS; SUBCUTANEOUS at 12:45

## 2020-12-16 RX ADMIN — FENTANYL CITRATE 30 MILLILITER(S): 50 INJECTION INTRAVENOUS at 14:16

## 2020-12-16 RX ADMIN — Medication 100 MILLIGRAM(S): at 23:13

## 2020-12-16 RX ADMIN — Medication 975 MILLIGRAM(S): at 17:43

## 2020-12-16 RX ADMIN — Medication 975 MILLIGRAM(S): at 06:44

## 2020-12-16 RX ADMIN — FENTANYL CITRATE 30 MILLILITER(S): 50 INJECTION INTRAVENOUS at 18:43

## 2020-12-16 RX ADMIN — HYDROMORPHONE HYDROCHLORIDE 0.5 MILLIGRAM(S): 2 INJECTION INTRAMUSCULAR; INTRAVENOUS; SUBCUTANEOUS at 13:55

## 2020-12-16 RX ADMIN — Medication 975 MILLIGRAM(S): at 18:18

## 2020-12-16 RX ADMIN — APREPITANT 40 MILLIGRAM(S): 80 CAPSULE ORAL at 06:42

## 2020-12-16 RX ADMIN — Medication 975 MILLIGRAM(S): at 23:12

## 2020-12-16 RX ADMIN — HYDROMORPHONE HYDROCHLORIDE 0.5 MILLIGRAM(S): 2 INJECTION INTRAMUSCULAR; INTRAVENOUS; SUBCUTANEOUS at 13:46

## 2020-12-16 RX ADMIN — CELECOXIB 200 MILLIGRAM(S): 200 CAPSULE ORAL at 06:42

## 2020-12-16 RX ADMIN — METHOCARBAMOL 750 MILLIGRAM(S): 500 TABLET, FILM COATED ORAL at 22:32

## 2020-12-16 RX ADMIN — FENTANYL CITRATE 30 MILLILITER(S): 50 INJECTION INTRAVENOUS at 16:51

## 2020-12-16 RX ADMIN — HYDROMORPHONE HYDROCHLORIDE 0.5 MILLIGRAM(S): 2 INJECTION INTRAMUSCULAR; INTRAVENOUS; SUBCUTANEOUS at 12:58

## 2020-12-16 RX ADMIN — SENNA PLUS 2 TABLET(S): 8.6 TABLET ORAL at 22:32

## 2020-12-16 RX ADMIN — FENTANYL CITRATE 30 MILLILITER(S): 50 INJECTION INTRAVENOUS at 19:13

## 2020-12-16 NOTE — CONSULT NOTE ADULT - CONSULT REASON
s/p lumbar fusion due to Spondylolisthesis at L5-S1 level ,   POD # 0 , postop medical mgmt requested

## 2020-12-16 NOTE — CONSULT NOTE ADULT - PROBLEM SELECTOR RECOMMENDATION 5
DVT prophylaxis  - as per ortho protocol , d/w Diamond CORDOVA , states  ASA 325mg daily is OK from ortho stand point   Opioid induced constipation  prophylaxis - bowel regimen

## 2020-12-16 NOTE — CONSULT NOTE ADULT - PROBLEM SELECTOR RECOMMENDATION 9
s/p Lumbar fusion ,   PT/OT/pain mgmt  DVT prophylaxis- as per ortho  Abx as per SCIP  Incentive spirometry  Prophylaxis of opioid  induced constipation.   PETRA as per ortho

## 2020-12-16 NOTE — PHYSICAL THERAPY INITIAL EVALUATION ADULT - DISCHARGE DISPOSITION, PT EVAL
home with assist/supervision prn for safety, RW and outpatient PT when cleared by MD pending progress and pending stair assessment as pt is an increased falls risk and has multiple stairs to negotiate at home, deeming pt unsafe to return home at this time.

## 2020-12-16 NOTE — PROGRESS NOTE ADULT - SUBJECTIVE AND OBJECTIVE BOX
SULAIMAN [id number]    50yMale  Spondylolisthesis of lumbosacral region    FH: diabetes mellitus (Mother, Father)    FHx: heart disease (Mother)    No pertinent family history in first degree relatives    No pertinent family history in first degree relatives    At risk for sleep apnea    History of syncope    Spondylolisthesis, lumbosacral region    DM (diabetes mellitus)    Arthritis    Pre-diabetes    GERD (gastroesophageal reflux disease)    HLD (hyperlipidemia)    Lumbar disc herniation    GERD (gastroesophageal reflux disease)    Sciatica    Diabetes mellitus, type II    Flank pain, acute    Spondylolisthesis at L5-S1 level    Spondylolisthesis at L5-S1 level    Spondylolisthesis, lumbosacral region    DM (diabetes mellitus)    DM (diabetes mellitus)    Need for prophylactic measure    Lumbar fusion    No significant past surgical history    No significant past surgical history    SPONDYLOLISTHESIS, LUMBOSACRAL    SysAdmin_VstLnk      STATUS POST:  lumbar laminectomy L5/L5-S1 Posterior instrumented, non instrumented and interbody fusion for lumbar stenosis with neurogenic claudication, back pain due to spondylolisthesis respectively  SUBJECTIVE: Patient seen and examined doing well    Back Pain controlled, lower extremity pain resolving    OBJECTIVE:   T(C): 36.9 (12-16-20 @ 06:04), Max: 36.9 (12-16-20 @ 06:04)  HR: 67 (12-16-20 @ 06:04) (67 - 67)  BP: 145/93 (12-16-20 @ 06:04) (145/93 - 145/93)  RR: 17 (12-16-20 @ 06:04) (17 - 17)  SpO2: 100% (12-16-20 @ 06:04) (100% - 100%)   Constitutional: Pleasant in no acute distress  Psych:A&Ox3  Abdominal: soft and supple non distended  Lymphatics: no pretibial pitting edema  Spine:          Dressing:  clean/dry/intact                            PETRA drain in place, patent               Sensation:            Upper extremity          grossly intact manually          Lower extremity           grossly intact manually                               Motor:         Lower extremity                       HF(l2)   KE(l3)    TA(l4)   EHL(l5)  GS(s1)                                                 R        5/5        5/5        5/5       5/5         5/5                                               L         5/5        5/5       5/5       5/5          5/5                                                        [x] warm well perfused; capillary refill <3 seconds                A/P : 50yMale   S/P Lumbar laminectomy and fusion as above  POD#0  -    Pain control- PCA x 24 hours then oral pain meds.  multi modal approach with tylenol, celebrex, methocarbamol, gabapentin.  start oxycodone when PCA is discontinued.   -    DVT ppx: [ x]SCDs[ x] Pharmacolgic   mg once daily x 28 days starting tomorrow.   -    Periop abx:  Ancef [x ]     -    Check AM labs    -    Monitor Drain Output, can discontinue drain when output equal or less than 10 cc/hr/12 hr.  change dressing when drain pulled and as needed, honeycomb dressing.    -  Resume home meds as appropriate  -PT WBAT, balance and gait  - LSO with OOB not ordered/not mandatory.  Ortho/PT team can reevaluate possible benefit for additional external support daily, in post op period.   -medical follow up- Hospitalist Dr Perez (Dr Ocampo will be covering on 12/18)   - DM 2- sliding scale, diabetic diet, nutrition consult.   - HTN will restart antihypertensives as appropriate with parameters  - probable home 3-4 days SULAIMAN [id number]    50yMale  Spondylolisthesis of lumbosacral region    FH: diabetes mellitus (Mother, Father)    FHx: heart disease (Mother)    No pertinent family history in first degree relatives    No pertinent family history in first degree relatives    At risk for sleep apnea    History of syncope    Spondylolisthesis, lumbosacral region    DM (diabetes mellitus)    Arthritis    Pre-diabetes    GERD (gastroesophageal reflux disease)    HLD (hyperlipidemia)    Lumbar disc herniation    GERD (gastroesophageal reflux disease)    Sciatica    Diabetes mellitus, type II    Flank pain, acute    Spondylolisthesis at L5-S1 level    Spondylolisthesis at L5-S1 level    Spondylolisthesis, lumbosacral region    DM (diabetes mellitus)    DM (diabetes mellitus)    Need for prophylactic measure    Lumbar fusion    No significant past surgical history    No significant past surgical history    SPONDYLOLISTHESIS, LUMBOSACRAL    SysAdmin_VstLnk      STATUS POST:  lumbar laminectomy L5/L5-S1 Posterior instrumented, non instrumented and interbody fusion for lumbar stenosis with neurogenic claudication, back pain due to spondylolisthesis respectively  SUBJECTIVE: Patient seen and examined doing well    Back Pain controlled, lower extremity pain resolving    OBJECTIVE:   T(C): 36.9 (12-16-20 @ 06:04), Max: 36.9 (12-16-20 @ 06:04)  HR: 67 (12-16-20 @ 06:04) (67 - 67)  BP: 145/93 (12-16-20 @ 06:04) (145/93 - 145/93)  RR: 17 (12-16-20 @ 06:04) (17 - 17)  SpO2: 100% (12-16-20 @ 06:04) (100% - 100%)   Constitutional: Pleasant in no acute distress  Psych:A&Ox3  Abdominal: soft and supple non distended  Lymphatics: no pretibial pitting edema  Spine:          Dressing:  clean/dry/intact                            PETRA drain in place, patent               Sensation:            Upper extremity          grossly intact manually          Lower extremity           grossly intact manually                               Motor:         Lower extremity                       HF(l2)   KE(l3)    TA(l4)   EHL(l5)  GS(s1)                                                 R        5/5        5/5        5/5       5/5         5/5                                               L         5/5        5/5       5/5       5/5          5/5                                                        [x] warm well perfused; capillary refill <3 seconds                A/P : 50yMale   S/P Lumbar laminectomy and fusion as above  POD#0  -    Pain control- PCA x 24 hours then oral pain meds.  multi modal approach with tylenol, celebrex, methocarbamol, gabapentin.  start oxycodone when PCA is discontinued.   -    DVT ppx: [ x]SCDs[ x] Pharmacolgic   mg once daily x 28 days starting tomorrow.   -    Periop abx:  Ancef [x ]     -    Check AM labs    -    Monitor Drain Output, can discontinue drain when output equal or less than 10 cc/hr/12 hr.  change dressing when drain pulled and as needed, honeycomb dressing.    -  Resume home meds as appropriate  -PT WBAT, balance and gait  - LSO with OOB not ordered/not mandatory.  Ortho/PT team can reevaluate possible benefit for additional external support daily, in post op period.   -medical follow up- Hospitalist Dr Perez (Dr Ocampo will be covering on 12/18)   - DM 2- sliding scale, diabetic diet, nutrition consult.   - probable home 3-4 days SULAIMAN [id number]    50yMale  Spondylolisthesis of lumbosacral region    FH: diabetes mellitus (Mother, Father)    FHx: heart disease (Mother)    No pertinent family history in first degree relatives    No pertinent family history in first degree relatives    At risk for sleep apnea    History of syncope    Spondylolisthesis, lumbosacral region    DM (diabetes mellitus)    Arthritis    Pre-diabetes    GERD (gastroesophageal reflux disease)    HLD (hyperlipidemia)    Lumbar disc herniation    GERD (gastroesophageal reflux disease)    Sciatica    Diabetes mellitus, type II    Flank pain, acute    Spondylolisthesis at L5-S1 level    Spondylolisthesis at L5-S1 level    Spondylolisthesis, lumbosacral region    DM (diabetes mellitus)    DM (diabetes mellitus)    Need for prophylactic measure    Lumbar fusion    No significant past surgical history    No significant past surgical history    SPONDYLOLISTHESIS, LUMBOSACRAL    SysAdmin_VstLnk      STATUS POST:  lumbar laminectomy L5/L5-S1 Posterior instrumented, non instrumented and interbody fusion for lumbar stenosis with neurogenic claudication, back pain due to spondylolisthesis respectively  SUBJECTIVE: Patient seen and examined doing well    Back Pain controlled, lower extremity pain resolving    OBJECTIVE:   T(C): 36.9 (12-16-20 @ 06:04), Max: 36.9 (12-16-20 @ 06:04)  HR: 67 (12-16-20 @ 06:04) (67 - 67)  BP: 145/93 (12-16-20 @ 06:04) (145/93 - 145/93)  RR: 17 (12-16-20 @ 06:04) (17 - 17)  SpO2: 100% (12-16-20 @ 06:04) (100% - 100%)   Constitutional: Pleasant in no acute distress  Psych:A&Ox3  Abdominal: soft and supple non distended  Lymphatics: no pretibial pitting edema  Spine:          Dressing:  clean/dry/intact                            PETRA drain in place, patent               Sensation:            Upper extremity          grossly intact manually on the left, on the right, radial nerve distribution paresthesia of wrist and hand likely due to arterial line placement and positioning          Lower extremity           grossly intact manually                               Motor:         Lower extremity                       HF(l2)   KE(l3)    TA(l4)   EHL(l5)  GS(s1)                                                 R        5/5        5/5        5/5       5/5         5/5                                               L         5/5        5/5       5/5       5/5          5/5                                                        [x] warm well perfused; capillary refill <3 seconds                A/P : 50yMale   S/P Lumbar laminectomy and fusion as above  POD#0  -    Pain control- PCA x 24 hours then oral pain meds.  multi modal approach with tylenol, celebrex, methocarbamol, gabapentin.  start oxycodone when PCA is discontinued.   -    DVT ppx: [ x]SCDs[ x] Pharmacolgic   mg once daily x 28 days starting tomorrow.   -    Periop abx:  Ancef [x ]     -    Check AM labs    -    Monitor Drain Output, can discontinue drain when output equal or less than 10 cc/hr/12 hr.  change dressing when drain pulled and as needed, honeycomb dressing.    -  Resume home meds as appropriate  -PT WBAT, balance and gait  - LSO with OOB not ordered/not mandatory.  Ortho/PT team can reevaluate possible benefit for additional external support daily, in post op period.   -medical follow up- Hospitalist Dr Perez (Dr Ocampo will be covering on 12/18 and thereafter)   - DM 2- sliding scale, diabetic diet, nutrition consult.   - paresthesia of right wrist and hand- reassurance should be given that this symptom will subside with time, likely due to arterial line placement and/or intraoperative positioning.   - probable home 3-4 days

## 2020-12-16 NOTE — BRIEF OPERATIVE NOTE - NSICDXBRIEFPREOP_GEN_ALL_CORE_FT
PRE-OP DIAGNOSIS:  Spondylolisthesis at L5-S1 level 16-Dec-2020 06:51:00  Jassi Khalil  
PRE-OP DIAGNOSIS:  Spondylolisthesis at L5-S1 level 16-Dec-2020 06:51:00  Jassi Khalil

## 2020-12-16 NOTE — DISCHARGE NOTE PROVIDER - NSDCMRMEDTOKEN_GEN_ALL_CORE_FT
metFORMIN 500 mg oral tablet: 1 tab(s) orally 2 times a day  methocarbamol 750 mg oral tablet: 2 tab(s) orally 3 times a day  omeprazole 40 mg oral delayed release capsule: 1 cap(s) orally once a day   Aspirin Enteric Coated 325 mg oral delayed release tablet: 1 tab(s) orally once a day   metFORMIN 500 mg oral tablet: 1 tab(s) orally 2 times a day  methocarbamol 750 mg oral tablet: 2 tab(s) orally 3 times a day  omeprazole 20 mg oral delayed release capsule: 1 cap(s) orally once a day   omeprazole 40 mg oral delayed release capsule: 1 cap(s) orally once a day  oxyCODONE 5 mg oral tablet: 1-2 tab(s) orally every 4 to 6 hours, As Needed -for severe pain MDD:6   Robaxin-750 oral tablet: 1 tab(s) orally 3 times a day, As Needed -for muscle spasm MDD:3  Senna S 50 mg-8.6 mg oral tablet: 2 tab(s) orally once a day (at bedtime)

## 2020-12-16 NOTE — PHYSICAL THERAPY INITIAL EVALUATION ADULT - PHYSICAL ASSIST/NONPHYSICAL ASSIST: GAIT, REHAB EVAL
required increased assistance + verbal cues to help maintain proper upright walking posture + for proper use of AD./1 person assist

## 2020-12-16 NOTE — BRIEF OPERATIVE NOTE - NSICDXBRIEFPROCEDURE_GEN_ALL_CORE_FT
PROCEDURES:  Lumbar fusion 16-Dec-2020 06:50:44  Jassi Khalil  
PROCEDURES:  Lumbar fusion 16-Dec-2020 06:50:44  Jassi Khalil

## 2020-12-16 NOTE — DISCHARGE NOTE PROVIDER - CARE PROVIDER_API CALL
Jassi Khalil (DO)  Orthopaedic Surgery  [address], [address]  [place] [address]  Phone: [phone]  Fax: [phone]  Follow Up Time:

## 2020-12-16 NOTE — PHYSICAL THERAPY INITIAL EVALUATION ADULT - PHYSICAL ASSIST/NONPHYSICAL ASSIST: STAND/SIT, REHAB EVAL
required increased assistance  to help safely lower to a sitting position + verbal cues for proper hand placement./1 person assist

## 2020-12-16 NOTE — DISCHARGE NOTE PROVIDER - HOSPITAL COURSE
Patient was admitted for elective posterior lumbar instrumented fusion on 12/16/2020, post operative course was uneventful.  Drain was pulled without incident and dry dressing placed.  PT/OT worked with patient for acute rehabilitation process.  Pain is now adequately controlled and patient is able to go home as she can accomplish ADL with help from family member at this time.

## 2020-12-16 NOTE — CONSULT NOTE ADULT - PROBLEM SELECTOR RECOMMENDATION 4
- noted on CT done as on outpatient , didn't d/w patient this issue as patient just came out of surgery , doesn't look comfortable , will d/w patient in am ,   - likely incidental L adrenal  adenoma 3.2 cm , and if this is new finding but adrenalprotocol CT or MRI is recommended for confirmation.

## 2020-12-16 NOTE — BRIEF OPERATIVE NOTE - OPERATION/FINDINGS
spondy
I personally assisted all aspects of positioning prone for a posterior lumbar approach on a modular Steffen table. Lumbar spine was cleansed with Betadine by me then cleansed with DuraPrep by RN.  I marked operative vertebrae with the use of C-arm.  I participated in a positioning of blue drapes and ioban and participated in operative timeout.  A longitudinal incision was placed over the L4-S1 spinous processes, 20 mL of 1% lidocaine local anesthetic was placed. I assisted in dissection to the spinous processes then fluoroscopic imaging confirmed the L5, S1 transverse processes on patient left. I then assisted with dissection on the patient’s right, completely exposing the  L4-L5, L5-s1 facet, transverse process of L5, s1 mammillary process as well as the pars of the L5 through S1 region and caudal aspect of the L4-L5 facet.  During screw placement I performed the millivolt potential analysis for each screw, ensuring acceptable values. After screw insertion, I assisted annulotomy and subsequent placement of biomechanical grafts in the disc space at L5.-S1   I assisted with placement of posterolateral graft. Rods were then placed with my assistance and I confirmed that pedicle screws were appropriately tightened and torqued.  I provided visualization and assisted with hemostasis throughout the procedure by using sequential retraction, continuous suction, utilization of Surgi-Harlan and dry gel foam, packing with Ray-Beatriz, and Bovie cauterization.  I made drain incision cranial to surgical incision, and placed HV drain. The surgical area was irrigated copiously.   I assisted in Deep fascial closure was conducted with 0 Vicryl.  I personally performed closure of superficial fascia with 2-0 Vicryl, Monocryl, Dermabond, longitudinal Steri-Strips, Mepilex then Island Dressing.  Xeroform,  2 x 2, Tegaderm was placed over the drain site. Placed Marcaine 30 cc local anesthetic.

## 2020-12-16 NOTE — DISCHARGE NOTE PROVIDER - NSDCFUADDINST_GEN_ALL_CORE_FT
Follow-up with Dr. Khalil within 7-10 days. Dressing change daily until dry, then leave dressing in place until office follow-up. Pain medications as indicated and titrated to patient's needs. Patient will begin aspirin 325mg once daily for 4 weeks post-operatively for clot prevention.  Ambulate with assistance of walker. Contact office if the wound becomes red, opens or discharge increases. Follow-up with Dr. Khalil within 7-10 days. Leave dressing in place until office follow-up. Pain medications as indicated and titrated to patient's needs. Patient will begin aspirin 325mg once daily for 4 weeks post-operatively for clot prevention.  Ambulate with assistance of walker. Contact office if the wound becomes red, opens or discharge increases.

## 2020-12-16 NOTE — PHYSICAL THERAPY INITIAL EVALUATION ADULT - ADDITIONAL COMMENTS
Pt lives with spouse and adult grandson and grand-daughter in a private home with 2 JESUSITA 1 handrail + 5 steps 2 handrails to bed&bath. Pt's PLOF was independent in all ADLs/ambulation with SAC and was driving PTA. Pt has SAC, commode and shower chair DME

## 2020-12-16 NOTE — PHYSICAL THERAPY INITIAL EVALUATION ADULT - PHYSICAL ASSIST/NONPHYSICAL ASSIST: SIT/STAND, REHAB EVAL
required increased assistance  to help rise to a full standing position +verbal cues for proper hand placement./1 person assist

## 2020-12-16 NOTE — PHYSICAL THERAPY INITIAL EVALUATION ADULT - GENERAL OBSERVATIONS, REHAB EVAL
Pt received on 2GUL, RN [place] ansari'ed pt for PT. pt observed supine in bed with PCA pump, IV, PETRA bulb drain x 1, gomez, VCBs, pleasant, cooperative, A&O and c/o 8/10 LBP t/o eval.

## 2020-12-16 NOTE — CONSULT NOTE ADULT - SUBJECTIVE AND OBJECTIVE BOX
PMD : Anderson  Cardio : Sonal ( [facility]  )     Patient is a 50y old  Male who is s/p lumbar fusion , POD # 0 , tolerated procedure well .   Seen and examined on PACU .     CC: chronic back pain with radiculopathy       HPI:  49 y/o with NIDDM Type 2, chronic back pain, GERD,  OA, lumbar herniated disc .  Pt report lower back pain that radiates to bilateral lower extremities pain, left leg pain worse with ambulation, described pain as burning sensation that radiates from back to bilateral thigh, legs, intermittent numbness and tingling of both legs ,  back pain started around 2013, pain has progressively gotten worse for the past two years and  became unbearable since March of this year.    Report prior conversational treatment of physical therapy and pain management with steroid injections 3x with poor efficacy. Pt requires use of a cane for mobility, report pain sometimes relieved with rest, muscle relaxant and constant reposition.  Now s/p Lumbar fusion .       PAST MEDICAL & SURGICAL HISTORY:  At risk for sleep apnea    History of syncope  episode 6 years ago, follow by cardiologist     Spondylolisthesis, lumbosacral region    NIDDM (diabetes mellitus)    Arthritis    GERD (gastroesophageal reflux disease)    HLD (hyperlipidemia)    Lumbar disc herniation    Sciatica    No significant past surgical history        Social History:  Tobacco - ex smoker , quit 34 yrs ago   ETOH - denies   Illicit drug abuse - denies    FAMILY HISTORY:  FH: diabetes mellitus (Mother, Father)    FHx: heart disease (Mother)        Allergies    tramadol (Urticaria)    Intolerances        HOME MEDICATIONS :     · 	metFORMIN 500 mg oral tablet: Last Dose Taken:  , 1 tab(s) orally 2 times a day  · 	omeprazole 40 mg oral delayed release capsule: Last Dose Taken:  , 1 cap(s) orally once a day  · 	methocarbamol 750 mg oral tablet: Last Dose Taken:  , 2 tab(s) orally 3 times a day    REVIEW OF SYSTEMS:    Chronic low back pain with b/l radiculopathy     MEDICATIONS  (STANDING):  fentaNYL PCA (50 MICROgram(s)/mL) 30 milliLiter(s) PCA Continuous PCA Continuous  lactated ringers. 1000 milliLiter(s) (75 mL/Hr) IV Continuous <Continuous>    MEDICATIONS  (PRN):  HYDROmorphone  Injectable 0.5 milliGRAM(s) IV Push every 10 minutes PRN Moderate Pain (4 - 6)  ondansetron Injectable 4 milliGRAM(s) IV Push once PRN Nausea and/or Vomiting      Vital Signs Last 24 Hrs  T(C): 36.6 (16 Dec 2020 12:32), Max: 36.9 (16 Dec 2020 06:04)  T(F): 97.9 (16 Dec 2020 12:32), Max: 98.4 (16 Dec 2020 06:04)  HR: 87 (16 Dec 2020 14:45) (67 - 105)  BP: 122/87 (16 Dec 2020 14:30) (110/89 - 145/93)  BP(mean): 94 (16 Dec 2020 14:30) (83 - 94)  RR: 15 (16 Dec 2020 14:45) (7 - 20)  SpO2: 100% (16 Dec 2020 14:45) (95% - 100%)    PHYSICAL EXAM:    GENERAL: NAD, well-groomed, well-developed  HEAD:  Atraumatic, Normocephalic  EYES: EOMI, PERRLA, conjunctiva and sclera clear  NECK: Supple, No JVD, Normal thyroid  NERVOUS SYSTEM:  Alert & Oriented X3   CHEST/LUNG: CTA  b/l,  no rales, rhonchi, wheezing, or rubs  HEART: Regular rate and rhythm; No murmurs, rubs, or gallops  ABDOMEN: Soft, Nontender, Nondistended; Bowel sounds present  EXTREMITIES:  2+ Peripheral Pulses, No clubbing, cyanosis, or edema ,   LYMPH: No lymphadenopathy noted  SKIN: No rashes or lesions  Back - PETRA +     LABS: Pending       RADIOLOGY & ADDITIONAL STUDIES:    < from: CT Lumbar Spine No Cont (10.15.20 @ 08:17) >    EXAM:  CT LUMBAR SPINE        PROCEDURE DATE:  10/15/2020           INTERPRETATION:  CLINICAL INDICATION: Spondylolisthesis. Severe lower back pain and constant right sciatic pain. Right lower extremity numbness.    TECHNIQUE: CT of the lumbar spine was performed without the administration of intravenous contrast, according to standard protocol.    COMPARISON: MRI lumbar spine 9/30/2020.    FINDINGS:  BONES AND DISCS:  Slight leftward curvature of the lumbar spine.    No acute fracture or dislocation.    Bilateral chronic pars defects are noted at L5 with grade 1 anterolisthesis of L5 on S1. Severe bilateral L5-S1 facet joint arthrosis is again noted. There is moderate right and severe left neuroforaminal stenosis.    Please refer to comparison MRI for further information regarding the spinal canal soft tissue contents.    SOFT TISSUES:  Paraspinal soft tissues are unremarkable.    OTHER:  Included retroperitoneum and pelvis are unremarkable.    Hepatic steatosis is noted. 3.2 x 2.2 cm enlargement of the medial limb left adrenal gland.    IMPRESSION:  Chronic bilateral L5 pars defects with grade 1 L5-S1 anterolisthesis. Moderate right and severe left neuroforaminal stenosis.    3.2 cm left adrenal lesion, likely adenoma, but adrenalprotocol CT or MRI is recommended for confirmation.      KHURRAM ADAIR M.D., ATTENDING RADIOLOGIST   This document has been electronically signed. Oct 15 2020  2:16PM    < end of copied text >

## 2020-12-16 NOTE — BRIEF OPERATIVE NOTE - NSICDXBRIEFPOSTOP_GEN_ALL_CORE_FT
POST-OP DIAGNOSIS:  Spondylolisthesis at L5-S1 level 16-Dec-2020 06:51:13  Jassi Khalil  
POST-OP DIAGNOSIS:  Spondylolisthesis at L5-S1 level 16-Dec-2020 06:51:13  Jassi Khalil

## 2020-12-16 NOTE — DISCHARGE NOTE PROVIDER - NSDCCPCAREPLAN_GEN_ALL_CORE_FT
PRINCIPAL DISCHARGE DIAGNOSIS  Diagnosis: Spondylolisthesis, lumbosacral region  Assessment and Plan of Treatment:

## 2020-12-16 NOTE — CONSULT NOTE ADULT - ASSESSMENT
51 y/o with NIDDM Type 2, chronic back pain, GERD,  OA, lumbar herniated disc .  Pt report lower back pain that radiates to bilateral lower extremities pain, left leg pain worse with ambulation, described pain as burning sensation that radiates from back to bilateral thigh, legs, intermittent numbness and tingling of both legs ,  back pain started around 2013, pain has progressively gotten worse for the past two years and  became unbearable since March of this year.    Report prior conversational treatment of physical therapy and pain management with steroid injections 3x with poor efficacy. Pt requires use of a cane for mobility, report pain sometimes relieved with rest, muscle relaxant and constant reposition.  Now s/p Lumbar fusion .

## 2020-12-17 ENCOUNTER — TRANSCRIPTION ENCOUNTER (OUTPATIENT)
Age: 50
End: 2020-12-17

## 2020-12-17 LAB
ANION GAP SERPL CALC-SCNC: 10 MMOL/L — SIGNIFICANT CHANGE UP (ref 5–17)
BASOPHILS # BLD AUTO: 0.01 K/UL — SIGNIFICANT CHANGE UP (ref 0–0.2)
BASOPHILS NFR BLD AUTO: 0.1 % — SIGNIFICANT CHANGE UP (ref 0–2)
BUN SERPL-MCNC: 8 MG/DL — SIGNIFICANT CHANGE UP (ref 8–20)
CALCIUM SERPL-MCNC: 8.3 MG/DL — LOW (ref 8.6–10.2)
CHLORIDE SERPL-SCNC: 106 MMOL/L — SIGNIFICANT CHANGE UP (ref 98–107)
CO2 SERPL-SCNC: 23 MMOL/L — SIGNIFICANT CHANGE UP (ref 22–29)
CREAT SERPL-MCNC: 0.78 MG/DL — SIGNIFICANT CHANGE UP (ref 0.5–1.3)
EOSINOPHIL # BLD AUTO: 0 K/UL — SIGNIFICANT CHANGE UP (ref 0–0.5)
EOSINOPHIL NFR BLD AUTO: 0 % — SIGNIFICANT CHANGE UP (ref 0–6)
GLUCOSE BLDC GLUCOMTR-MCNC: 135 MG/DL — HIGH (ref 70–99)
GLUCOSE BLDC GLUCOMTR-MCNC: 154 MG/DL — HIGH (ref 70–99)
GLUCOSE BLDC GLUCOMTR-MCNC: 158 MG/DL — HIGH (ref 70–99)
GLUCOSE BLDC GLUCOMTR-MCNC: 174 MG/DL — HIGH (ref 70–99)
GLUCOSE SERPL-MCNC: 121 MG/DL — HIGH (ref 70–99)
HCT VFR BLD CALC: 36 % — LOW (ref 39–50)
HGB BLD-MCNC: 12.1 G/DL — LOW (ref 13–17)
IMM GRANULOCYTES NFR BLD AUTO: 0.3 % — SIGNIFICANT CHANGE UP (ref 0–1.5)
LYMPHOCYTES # BLD AUTO: 1.11 K/UL — SIGNIFICANT CHANGE UP (ref 1–3.3)
LYMPHOCYTES # BLD AUTO: 9.6 % — LOW (ref 13–44)
MCHC RBC-ENTMCNC: 31.9 PG — SIGNIFICANT CHANGE UP (ref 27–34)
MCHC RBC-ENTMCNC: 33.6 GM/DL — SIGNIFICANT CHANGE UP (ref 32–36)
MCV RBC AUTO: 95 FL — SIGNIFICANT CHANGE UP (ref 80–100)
MONOCYTES # BLD AUTO: 1.24 K/UL — HIGH (ref 0–0.9)
MONOCYTES NFR BLD AUTO: 10.8 % — SIGNIFICANT CHANGE UP (ref 2–14)
NEUTROPHILS # BLD AUTO: 9.13 K/UL — HIGH (ref 1.8–7.4)
NEUTROPHILS NFR BLD AUTO: 79.2 % — HIGH (ref 43–77)
PLATELET # BLD AUTO: 221 K/UL — SIGNIFICANT CHANGE UP (ref 150–400)
POTASSIUM SERPL-MCNC: 4 MMOL/L — SIGNIFICANT CHANGE UP (ref 3.5–5.3)
POTASSIUM SERPL-SCNC: 4 MMOL/L — SIGNIFICANT CHANGE UP (ref 3.5–5.3)
RBC # BLD: 3.79 M/UL — LOW (ref 4.2–5.8)
RBC # FLD: 12.2 % — SIGNIFICANT CHANGE UP (ref 10.3–14.5)
SODIUM SERPL-SCNC: 139 MMOL/L — SIGNIFICANT CHANGE UP (ref 135–145)
WBC # BLD: 11.52 K/UL — HIGH (ref 3.8–10.5)
WBC # FLD AUTO: 11.52 K/UL — HIGH (ref 3.8–10.5)

## 2020-12-17 PROCEDURE — 99233 SBSQ HOSP IP/OBS HIGH 50: CPT

## 2020-12-17 RX ORDER — HYDROMORPHONE HYDROCHLORIDE 2 MG/ML
4 INJECTION INTRAMUSCULAR; INTRAVENOUS; SUBCUTANEOUS
Refills: 0 | Status: DISCONTINUED | OUTPATIENT
Start: 2020-12-17 | End: 2020-12-18

## 2020-12-17 RX ORDER — OXYCODONE HYDROCHLORIDE 5 MG/1
5 TABLET ORAL
Refills: 0 | Status: DISCONTINUED | OUTPATIENT
Start: 2020-12-17 | End: 2020-12-18

## 2020-12-17 RX ORDER — OXYCODONE HYDROCHLORIDE 5 MG/1
10 TABLET ORAL
Refills: 0 | Status: DISCONTINUED | OUTPATIENT
Start: 2020-12-17 | End: 2020-12-18

## 2020-12-17 RX ORDER — ACETAMINOPHEN 500 MG
1000 TABLET ORAL ONCE
Refills: 0 | Status: DISCONTINUED | OUTPATIENT
Start: 2020-12-17 | End: 2020-12-18

## 2020-12-17 RX ADMIN — METHOCARBAMOL 750 MILLIGRAM(S): 500 TABLET, FILM COATED ORAL at 22:43

## 2020-12-17 RX ADMIN — OXYCODONE HYDROCHLORIDE 5 MILLIGRAM(S): 5 TABLET ORAL at 12:28

## 2020-12-17 RX ADMIN — OXYCODONE HYDROCHLORIDE 5 MILLIGRAM(S): 5 TABLET ORAL at 17:07

## 2020-12-17 RX ADMIN — Medication 975 MILLIGRAM(S): at 06:04

## 2020-12-17 RX ADMIN — PANTOPRAZOLE SODIUM 40 MILLIGRAM(S): 20 TABLET, DELAYED RELEASE ORAL at 05:05

## 2020-12-17 RX ADMIN — METHOCARBAMOL 750 MILLIGRAM(S): 500 TABLET, FILM COATED ORAL at 12:28

## 2020-12-17 RX ADMIN — CELECOXIB 200 MILLIGRAM(S): 200 CAPSULE ORAL at 06:04

## 2020-12-17 RX ADMIN — CELECOXIB 200 MILLIGRAM(S): 200 CAPSULE ORAL at 05:04

## 2020-12-17 RX ADMIN — CELECOXIB 200 MILLIGRAM(S): 200 CAPSULE ORAL at 17:06

## 2020-12-17 RX ADMIN — HYDROMORPHONE HYDROCHLORIDE 4 MILLIGRAM(S): 2 INJECTION INTRAMUSCULAR; INTRAVENOUS; SUBCUTANEOUS at 22:41

## 2020-12-17 RX ADMIN — Medication 975 MILLIGRAM(S): at 17:44

## 2020-12-17 RX ADMIN — CELECOXIB 200 MILLIGRAM(S): 200 CAPSULE ORAL at 17:44

## 2020-12-17 RX ADMIN — GABAPENTIN 300 MILLIGRAM(S): 400 CAPSULE ORAL at 05:04

## 2020-12-17 RX ADMIN — SENNA PLUS 2 TABLET(S): 8.6 TABLET ORAL at 22:44

## 2020-12-17 RX ADMIN — Medication 975 MILLIGRAM(S): at 17:06

## 2020-12-17 RX ADMIN — GABAPENTIN 300 MILLIGRAM(S): 400 CAPSULE ORAL at 17:06

## 2020-12-17 RX ADMIN — Medication 325 MILLIGRAM(S): at 12:28

## 2020-12-17 RX ADMIN — Medication 975 MILLIGRAM(S): at 23:44

## 2020-12-17 RX ADMIN — Medication 1: at 12:33

## 2020-12-17 RX ADMIN — Medication 975 MILLIGRAM(S): at 00:12

## 2020-12-17 RX ADMIN — HYDROMORPHONE HYDROCHLORIDE 4 MILLIGRAM(S): 2 INJECTION INTRAMUSCULAR; INTRAVENOUS; SUBCUTANEOUS at 23:40

## 2020-12-17 RX ADMIN — SODIUM CHLORIDE 80 MILLILITER(S): 9 INJECTION, SOLUTION INTRAVENOUS at 05:05

## 2020-12-17 RX ADMIN — Medication 1: at 17:06

## 2020-12-17 RX ADMIN — Medication 975 MILLIGRAM(S): at 05:04

## 2020-12-17 RX ADMIN — Medication 975 MILLIGRAM(S): at 13:07

## 2020-12-17 RX ADMIN — METHOCARBAMOL 750 MILLIGRAM(S): 500 TABLET, FILM COATED ORAL at 05:04

## 2020-12-17 RX ADMIN — FENTANYL CITRATE 30 MILLILITER(S): 50 INJECTION INTRAVENOUS at 07:45

## 2020-12-17 RX ADMIN — OXYCODONE HYDROCHLORIDE 5 MILLIGRAM(S): 5 TABLET ORAL at 17:44

## 2020-12-17 RX ADMIN — OXYCODONE HYDROCHLORIDE 5 MILLIGRAM(S): 5 TABLET ORAL at 13:07

## 2020-12-17 RX ADMIN — Medication 975 MILLIGRAM(S): at 12:28

## 2020-12-17 NOTE — PROGRESS NOTE ADULT - SUBJECTIVE AND OBJECTIVE BOX
SULAIMAN [id number]  50yMale      STATUS POST:  lumbar laminectomy L5/L5-S1 Posterior instrumented, non instrumented and interbody fusion for lumbar stenosis with neurogenic claudication, back pain due to spondylolisthesis respectively  SUBJECTIVE: Patient seen and examined doing well. Patient reports numbness to first and second digit of right hand. Reports improvement. Denies motor dysfunction of this extremity. No other orthopedic complaints. Reports improvement of preoperative symptoms of lower extremity.     OBJECTIVE:   Vital Signs Last 24 Hrs  T(C): 36.6 (17 Dec 2020 04:14), Max: 36.8 (16 Dec 2020 20:14)  T(F): 97.9 (17 Dec 2020 04:14), Max: 98.2 (16 Dec 2020 20:14)  HR: 74 (17 Dec 2020 04:14) (74 - 105)  BP: 111/72 (17 Dec 2020 04:14) (105/64 - 132/84)  BP(mean): 97 (16 Dec 2020 16:45) (83 - 97)  RR: 18 (17 Dec 2020 04:14) (7 - 20)  SpO2: 94% (17 Dec 2020 04:14) (91% - 100%)      Constitutional: Pleasant in no acute distress  Psych:A&Ox3  Abdominal: soft and supple non distended  Spine:          Dressing:  clean/dry/intact                            PETRA drain in place, patent. Output: 300 cc over past 12 hours.                Sensation:            Upper extremity          grossly intact manually on the left, on the right, radial nerve distribution paresthesia of first and second digit. Reports improvement.          Lower extremity           grossly intact manually                               Motor:         Lower extremity                       HF(l2)   KE(l3)    TA(l4)   EHL(l5)  GS(s1)                                                 R        5/5        5/5        5/5       5/5         5/5                                               L         5/5        5/5       5/5       5/5          5/5                                                        [x] warm well perfused; capillary refill <3 seconds                A/P : 50yMale  S/P Lumbar laminectomy and fusion as above  POD#1    -    Pain control- PCA will be d/c'd at noon. Will switch to oral meds-      -     DVT ppx: [ x]SCDs[ x] Pharmacolgic   mg once daily x 28 days  -    Periop abx:  Ancef [x ]      -    Monitor Drain Output, can discontinue drain when output equal or less than 10 cc/hr/12 hr.  High output will continue to monitor (300 cc overnight)  -    PT WBAT, balance and gait  -    medical follow up- Hospitalist Dr Perez (Dr Ocampo will be covering on 12/18 and thereafter)   -   DM 2- sliding scale, diabetic diet, nutrition consult.   -    paresthesia of right wrist and hand- reassurance should be given that this symptom will subside with time, likely due to arterial line placement and/or intraoperative positioning. Improving.  -     probable home 3-4 days     SULAIMAN [id number]  50yMale      STATUS POST:  lumbar laminectomy L5/L5-S1 Posterior instrumented, non instrumented and interbody fusion for lumbar stenosis with neurogenic claudication, back pain due to spondylolisthesis respectively  SUBJECTIVE: Patient seen and examined doing well. Patient reports numbness to first and second digit of right hand. Reports improvement. Denies motor dysfunction of this extremity. No other orthopedic complaints. Reports improvement of preoperative symptoms of lower extremity.     OBJECTIVE:   Vital Signs Last 24 Hrs  T(C): 36.6 (17 Dec 2020 04:14), Max: 36.8 (16 Dec 2020 20:14)  T(F): 97.9 (17 Dec 2020 04:14), Max: 98.2 (16 Dec 2020 20:14)  HR: 74 (17 Dec 2020 04:14) (74 - 105)  BP: 111/72 (17 Dec 2020 04:14) (105/64 - 132/84)  BP(mean): 97 (16 Dec 2020 16:45) (83 - 97)  RR: 18 (17 Dec 2020 04:14) (7 - 20)  SpO2: 94% (17 Dec 2020 04:14) (91% - 100%)      Constitutional: Pleasant in no acute distress  Psych:A&Ox3  Abdominal: soft and supple non distended  Spine:          Dressing:  clean/dry/intact                            PETRA drain in place, patent. Output: 300 cc over past 12 hours.                Sensation:            Upper extremity          grossly intact manually on the left, on the right, radial nerve distribution paresthesia of first and second digit. Reports improvement.          Lower extremity           grossly intact manually                               Motor:         Lower extremity                       HF(l2)   KE(l3)    TA(l4)   EHL(l5)  GS(s1)                                                 R        5/5        5/5        5/5       5/5         5/5                                               L         5/5        5/5       5/5       5/5          5/5                                                        [x] warm well perfused; capillary refill <3 seconds                A/P : 50yMale  S/P Lumbar laminectomy and fusion as above  POD#1    -    Pain control- PCA will be d/c'd at noon. Will switch to oral meds-      -     DVT ppx: [ x]SCDs[ x] Pharmacolgic   mg once daily x 28 days  -    Periop abx:  Ancef [x ]      -    Monitor Drain Output, can discontinue drain when output equal or less than 10 cc/hr/12 hr.  High output will continue to monitor (300 cc overnight)  -    PT WBAT, balance and gait  -    medical follow up- Hospitalist Dr Ocampo   -   DM 2- sliding scale, diabetic diet, nutrition consult.   -    paresthesia of right wrist and hand- reassurance should be given that this symptom will subside with time, likely due to arterial line placement and/or intraoperative positioning. Improving.  -     probable home 3-4 days

## 2020-12-17 NOTE — DISCHARGE NOTE NURSING/CASE MANAGEMENT/SOCIAL WORK - PATIENT PORTAL LINK FT
You can access the FollowMyHealth Patient Portal offered by [facility] by registering at the following website: [email or web address]. By joining [facility]’s FollowMyHealth portal, you will also be able to view your health information using other applications (apps) compatible with our system.

## 2020-12-17 NOTE — PROGRESS NOTE ADULT - ASSESSMENT
51 y/o with NIDDM Type 2, chronic back pain, GERD,  OA, lumbar herniated disc .  Pt report lower back pain that radiates to bilateral lower extremities pain, left leg pain worse with ambulation, described pain as burning sensation that radiates from back to bilateral thigh, legs, intermittent numbness and tingling of both legs ,  back pain started around 2013, pain has progressively gotten worse for the past two years and  became unbearable since March of this year.    Report prior conversational treatment of physical therapy and pain management with steroid injections 3x with poor efficacy. Pt requires use of a cane for mobility, report pain sometimes relieved with rest, muscle relaxant and constant reposition.  Now s/p Lumbar fusion .        Problem/Recommendation - 1:  Problem: Spondylolisthesis, lumbosacral region. Recommendation: s/p Lumbar fusion POD#1 ,   PT/OT/pain mgmt- change apoorva today  DVT prophylaxis- as per ortho  Incentive spirometry  Prophylaxis of opioid  induced constipation.   PETRA as per ortho.     Problem/Recommendation - 2:  ·  Problem: History of syncope.  Recommendation: - follows with cardiologist.      Problem/Recommendation - 3:  ·  Problem: Type 2 diabetes mellitus without complication, without long-term current use of insulin.  Recommendation:, ADA diet ,   ISSC , accu checks. Resume Metformin on dc     Problem/Recommendation - 4:  ·  Problem: Adrenal abnormality.  Recommendation: - noted on CT done as on outpatient ,  likely incidental L adrenal  adenoma 3.2 cm , discussed with patient, he states he has no prior knowledge, adrenal protocol CT or MRI is recommended for confirmation to be performed and followed  up as outpatient with PMD     Problem/Recommendation - 5:  ·  Problem: Need for prophylactic measure.  Recommendation: DVT prophylaxis  - ASA 325mg daily is OK from ortho stand point   Opioid induced constipation  prophylaxis - bowel regimen.      Problem/Recommendation - 6:  Problem: Gastroesophageal reflux disease, unspecified whether esophagitis present. Recommendation: - continue PPI home dose .    Problem/Recommendation-7:  Problem: Leukocytosis. Plan: Probably reactive, no fever. Monitor CBC    Problem/Recommendation-8:  Problem: ABLA. Recommendation_ Monitor H&H. DC IV fluids when taking oral well.

## 2020-12-17 NOTE — PROGRESS NOTE ADULT - SUBJECTIVE AND OBJECTIVE BOX
CC: Spondylolisthesis        INTERVAL HPI/OVERNIGHT EVENTS: Patient sen and examined. POD #1.  Patient reports numbness to first and second digit of right hand.     Vital Signs Last 24 Hrs  T(C): 36.6 (17 Dec 2020 07:42), Max: 36.8 (16 Dec 2020 20:14)  T(F): 97.9 (17 Dec 2020 07:42), Max: 98.2 (16 Dec 2020 20:14)  HR: 68 (17 Dec 2020 07:42) (68 - 105)  BP: 132/86 (17 Dec 2020 07:42) (105/64 - 132/86)  BP(mean): 97 (16 Dec 2020 16:45) (83 - 97)  RR: 18 (17 Dec 2020 07:42) (7 - 20)  SpO2: 97% (17 Dec 2020 07:42) (91% - 100%)  I&O's Detail    16 Dec 2020 07:01  -  17 Dec 2020 07:00  --------------------------------------------------------  IN:  Total IN: 0 mL    OUT:    Bulb (mL): 440 mL    Indwelling Catheter - Urethral (mL): 1775 mL  Total OUT: 2215 mL    Total NET: -2215 mL                                    12.1   11.52 )-----------( 221      ( 17 Dec 2020 04:46 )             36.0     17 Dec 2020 04:46    139    |  106    |  8.0    ----------------------------<  121    4.0     |  23.0   |  0.78     Ca    8.3        17 Dec 2020 04:46        CAPILLARY BLOOD GLUCOSE      POCT Blood Glucose.: 135 mg/dL (17 Dec 2020 08:47)  POCT Blood Glucose.: 223 mg/dL (16 Dec 2020 22:31)  POCT Blood Glucose.: 140 mg/dL (16 Dec 2020 17:41)  POCT Blood Glucose.: 141 mg/dL (16 Dec 2020 12:40)          MEDICATIONS  (STANDING):  acetaminophen   Tablet .. 975 milliGRAM(s) Oral every 6 hours  aspirin enteric coated 325 milliGRAM(s) Oral daily  celecoxib 200 milliGRAM(s) Oral two times a day  dextrose 40% Gel 15 Gram(s) Oral once  dextrose 5%. 1000 milliLiter(s) (100 mL/Hr) IV Continuous <Continuous>  dextrose 50% Injectable 25 Gram(s) IV Push once  dextrose 50% Injectable 12.5 Gram(s) IV Push once  dextrose 50% Injectable 25 Gram(s) IV Push once  fentaNYL PCA (50 MICROgram(s)/mL) 30 milliLiter(s) PCA Continuous PCA Continuous  gabapentin 300 milliGRAM(s) Oral two times a day  glucagon  Injectable 1 milliGRAM(s) IntraMuscular once  insulin lispro (ADMELOG) corrective regimen sliding scale   SubCutaneous three times a day before meals  methocarbamol 750 milliGRAM(s) Oral three times a day  pantoprazole    Tablet 40 milliGRAM(s) Oral before breakfast  senna 2 Tablet(s) Oral at bedtime  sodium chloride 0.45%. 1000 milliLiter(s) (80 mL/Hr) IV Continuous <Continuous>    MEDICATIONS  (PRN):  aluminum hydroxide/magnesium hydroxide/simethicone Suspension 30 milliLiter(s) Oral every 12 hours PRN Indigestion  diazepam    Tablet 2 milliGRAM(s) Oral two times a day PRN anxiety or spasm refractory to methocarbamol  magnesium hydroxide Suspension 30 milliLiter(s) Oral every 12 hours PRN Constipation  ondansetron Injectable 4 milliGRAM(s) IV Push every 6 hours PRN Nausea      RADIOLOGY & ADDITIONAL TESTS:  EXAM:  CT LUMBAR SPINE        PROCEDURE DATE:  10/15/2020           INTERPRETATION:  CLINICAL INDICATION: Spondylolisthesis. Severe lower back pain and constant right sciatic pain. Right lower extremity numbness.    TECHNIQUE: CT of the lumbar spine was performed without the administration of intravenous contrast, according to standard protocol.    COMPARISON: MRI lumbar spine 9/30/2020.    FINDINGS:  BONES AND DISCS:  Slight leftward curvature of the lumbar spine.    No acute fracture or dislocation.    Bilateral chronic pars defects are noted at L5 with grade 1 anterolisthesis of L5 on S1. Severe bilateral L5-S1 facet joint arthrosis is again noted. There is moderate right and severe left neuroforaminal stenosis.    Please refer to comparison MRI for further information regarding the spinal canal soft tissue contents.    SOFT TISSUES:  Paraspinal soft tissues are unremarkable.    OTHER:  Included retroperitoneum and pelvis are unremarkable.    Hepatic steatosis is noted. 3.2 x 2.2 cm enlargement of the medial limb left adrenal gland.    IMPRESSION:  Chronic bilateral L5 pars defects with grade 1 L5-S1 anterolisthesis. Moderate right and severe left neuroforaminal stenosis.    3.2 cm left adrenal lesion, likely adenoma, but adrenalprotocol CT or MRI is recommended for confirmation.                   KHURRAM ADAIR M.D., ATTENDING RADIOLOGIST   This document has been electronically signed. Oct 15 2020  2:16PM   CC: Spondylolisthesis        INTERVAL HPI/OVERNIGHT EVENTS: Patient sen and examined. POD #1.  Patient reports numbness to first and second digit of right hand. Sitting in chair. Tolerate po diet. Back pain controlled on current RX. Denies chest pain, SOB, dizziness, palpitations, nausea, vomiting, fever, chills. PETRA in place.     Vital Signs Last 24 Hrs  T(C): 36.6 (17 Dec 2020 07:42), Max: 36.8 (16 Dec 2020 20:14)  T(F): 97.9 (17 Dec 2020 07:42), Max: 98.2 (16 Dec 2020 20:14)  HR: 68 (17 Dec 2020 07:42) (68 - 105)  BP: 132/86 (17 Dec 2020 07:42) (105/64 - 132/86)  BP(mean): 97 (16 Dec 2020 16:45) (83 - 97)  RR: 18 (17 Dec 2020 07:42) (7 - 20)  SpO2: 97% (17 Dec 2020 07:42) (91% - 100%)    PHYSICAL EXAM:    GENERAL: NAD, well-groomed, well-developed  HEAD:  Atraumatic, Normocephalic  EYES: EOMI, PERRLA, conjunctiva and sclera clear  NECK: Supple, No JVD, Normal thyroid  NERVOUS SYSTEM:  Alert & Oriented X3   CHEST/LUNG: CTA  b/l,  no rales, rhonchi, wheezing, or rubs  HEART: Regular rate and rhythm; No murmurs, rubs, or gallops  ABDOMEN: Soft, Nontender, Nondistended; Bowel sounds present  EXTREMITIES:  2+ Peripheral Pulses, No clubbing, cyanosis, or edema ,   SKIN: No rashes or lesions  Back - PETRA + DSD C/D/I    I&O's Detail    16 Dec 2020 07:01  -  17 Dec 2020 07:00  --------------------------------------------------------  IN:  Total IN: 0 mL    OUT:    Bulb (mL): 440 mL    Indwelling Catheter - Urethral (mL): 1775 mL  Total OUT: 2215 mL    Total NET: -2215 mL                                    12.1   11.52 )-----------( 221      ( 17 Dec 2020 04:46 )             36.0     17 Dec 2020 04:46    139    |  106    |  8.0    ----------------------------<  121    4.0     |  23.0   |  0.78     Ca    8.3        17 Dec 2020 04:46        CAPILLARY BLOOD GLUCOSE      POCT Blood Glucose.: 135 mg/dL (17 Dec 2020 08:47)  POCT Blood Glucose.: 223 mg/dL (16 Dec 2020 22:31)  POCT Blood Glucose.: 140 mg/dL (16 Dec 2020 17:41)  POCT Blood Glucose.: 141 mg/dL (16 Dec 2020 12:40)          MEDICATIONS  (STANDING):  acetaminophen   Tablet .. 975 milliGRAM(s) Oral every 6 hours  aspirin enteric coated 325 milliGRAM(s) Oral daily  celecoxib 200 milliGRAM(s) Oral two times a day  dextrose 40% Gel 15 Gram(s) Oral once  dextrose 5%. 1000 milliLiter(s) (100 mL/Hr) IV Continuous <Continuous>  dextrose 50% Injectable 25 Gram(s) IV Push once  dextrose 50% Injectable 12.5 Gram(s) IV Push once  dextrose 50% Injectable 25 Gram(s) IV Push once  fentaNYL PCA (50 MICROgram(s)/mL) 30 milliLiter(s) PCA Continuous PCA Continuous  gabapentin 300 milliGRAM(s) Oral two times a day  glucagon  Injectable 1 milliGRAM(s) IntraMuscular once  insulin lispro (ADMELOG) corrective regimen sliding scale   SubCutaneous three times a day before meals  methocarbamol 750 milliGRAM(s) Oral three times a day  pantoprazole    Tablet 40 milliGRAM(s) Oral before breakfast  senna 2 Tablet(s) Oral at bedtime  sodium chloride 0.45%. 1000 milliLiter(s) (80 mL/Hr) IV Continuous <Continuous>    MEDICATIONS  (PRN):  aluminum hydroxide/magnesium hydroxide/simethicone Suspension 30 milliLiter(s) Oral every 12 hours PRN Indigestion  diazepam    Tablet 2 milliGRAM(s) Oral two times a day PRN anxiety or spasm refractory to methocarbamol  magnesium hydroxide Suspension 30 milliLiter(s) Oral every 12 hours PRN Constipation  ondansetron Injectable 4 milliGRAM(s) IV Push every 6 hours PRN Nausea      RADIOLOGY & ADDITIONAL TESTS:  EXAM:  CT LUMBAR SPINE        PROCEDURE DATE:  10/15/2020           INTERPRETATION:  CLINICAL INDICATION: Spondylolisthesis. Severe lower back pain and constant right sciatic pain. Right lower extremity numbness.    TECHNIQUE: CT of the lumbar spine was performed without the administration of intravenous contrast, according to standard protocol.    COMPARISON: MRI lumbar spine 9/30/2020.    FINDINGS:  BONES AND DISCS:  Slight leftward curvature of the lumbar spine.    No acute fracture or dislocation.    Bilateral chronic pars defects are noted at L5 with grade 1 anterolisthesis of L5 on S1. Severe bilateral L5-S1 facet joint arthrosis is again noted. There is moderate right and severe left neuroforaminal stenosis.    Please refer to comparison MRI for further information regarding the spinal canal soft tissue contents.    SOFT TISSUES:  Paraspinal soft tissues are unremarkable.    OTHER:  Included retroperitoneum and pelvis are unremarkable.    Hepatic steatosis is noted. 3.2 x 2.2 cm enlargement of the medial limb left adrenal gland.    IMPRESSION:  Chronic bilateral L5 pars defects with grade 1 L5-S1 anterolisthesis. Moderate right and severe left neuroforaminal stenosis.    3.2 cm left adrenal lesion, likely adenoma, but adrenalprotocol CT or MRI is recommended for confirmation.                   KHURRAM ADAIR M.D., ATTENDING RADIOLOGIST   This document has been electronically signed. Oct 15 2020  2:16PM

## 2020-12-18 VITALS
OXYGEN SATURATION: 96 % | TEMPERATURE: 98 F | RESPIRATION RATE: 18 BRPM | SYSTOLIC BLOOD PRESSURE: 128 MMHG | DIASTOLIC BLOOD PRESSURE: 56 MMHG | HEART RATE: 99 BPM

## 2020-12-18 LAB
ANION GAP SERPL CALC-SCNC: 9 MMOL/L — SIGNIFICANT CHANGE UP (ref 5–17)
BASOPHILS # BLD AUTO: 0.04 K/UL — SIGNIFICANT CHANGE UP (ref 0–0.2)
BASOPHILS NFR BLD AUTO: 0.4 % — SIGNIFICANT CHANGE UP (ref 0–2)
BUN SERPL-MCNC: 8 MG/DL — SIGNIFICANT CHANGE UP (ref 8–20)
CALCIUM SERPL-MCNC: 8.8 MG/DL — SIGNIFICANT CHANGE UP (ref 8.6–10.2)
CHLORIDE SERPL-SCNC: 105 MMOL/L — SIGNIFICANT CHANGE UP (ref 98–107)
CO2 SERPL-SCNC: 25 MMOL/L — SIGNIFICANT CHANGE UP (ref 22–29)
CREAT SERPL-MCNC: 0.93 MG/DL — SIGNIFICANT CHANGE UP (ref 0.5–1.3)
EOSINOPHIL # BLD AUTO: 0.07 K/UL — SIGNIFICANT CHANGE UP (ref 0–0.5)
EOSINOPHIL NFR BLD AUTO: 0.7 % — SIGNIFICANT CHANGE UP (ref 0–6)
GLUCOSE BLDC GLUCOMTR-MCNC: 106 MG/DL — HIGH (ref 70–99)
GLUCOSE BLDC GLUCOMTR-MCNC: 98 MG/DL — SIGNIFICANT CHANGE UP (ref 70–99)
GLUCOSE SERPL-MCNC: 109 MG/DL — HIGH (ref 70–99)
HCT VFR BLD CALC: 38.6 % — LOW (ref 39–50)
HGB BLD-MCNC: 12.8 G/DL — LOW (ref 13–17)
IMM GRANULOCYTES NFR BLD AUTO: 0.2 % — SIGNIFICANT CHANGE UP (ref 0–1.5)
LYMPHOCYTES # BLD AUTO: 1.82 K/UL — SIGNIFICANT CHANGE UP (ref 1–3.3)
LYMPHOCYTES # BLD AUTO: 18.2 % — SIGNIFICANT CHANGE UP (ref 13–44)
MCHC RBC-ENTMCNC: 31.8 PG — SIGNIFICANT CHANGE UP (ref 27–34)
MCHC RBC-ENTMCNC: 33.2 GM/DL — SIGNIFICANT CHANGE UP (ref 32–36)
MCV RBC AUTO: 96 FL — SIGNIFICANT CHANGE UP (ref 80–100)
MONOCYTES # BLD AUTO: 0.81 K/UL — SIGNIFICANT CHANGE UP (ref 0–0.9)
MONOCYTES NFR BLD AUTO: 8.1 % — SIGNIFICANT CHANGE UP (ref 2–14)
NEUTROPHILS # BLD AUTO: 7.23 K/UL — SIGNIFICANT CHANGE UP (ref 1.8–7.4)
NEUTROPHILS NFR BLD AUTO: 72.4 % — SIGNIFICANT CHANGE UP (ref 43–77)
PLATELET # BLD AUTO: 221 K/UL — SIGNIFICANT CHANGE UP (ref 150–400)
POTASSIUM SERPL-MCNC: 3.9 MMOL/L — SIGNIFICANT CHANGE UP (ref 3.5–5.3)
POTASSIUM SERPL-SCNC: 3.9 MMOL/L — SIGNIFICANT CHANGE UP (ref 3.5–5.3)
RBC # BLD: 4.02 M/UL — LOW (ref 4.2–5.8)
RBC # FLD: 12.5 % — SIGNIFICANT CHANGE UP (ref 10.3–14.5)
SODIUM SERPL-SCNC: 139 MMOL/L — SIGNIFICANT CHANGE UP (ref 135–145)
WBC # BLD: 9.99 K/UL — SIGNIFICANT CHANGE UP (ref 3.8–10.5)
WBC # FLD AUTO: 9.99 K/UL — SIGNIFICANT CHANGE UP (ref 3.8–10.5)

## 2020-12-18 PROCEDURE — 82962 GLUCOSE BLOOD TEST: CPT

## 2020-12-18 PROCEDURE — 80048 BASIC METABOLIC PNL TOTAL CA: CPT

## 2020-12-18 PROCEDURE — 99232 SBSQ HOSP IP/OBS MODERATE 35: CPT

## 2020-12-18 PROCEDURE — 36415 COLL VENOUS BLD VENIPUNCTURE: CPT

## 2020-12-18 PROCEDURE — 85025 COMPLETE CBC W/AUTO DIFF WBC: CPT

## 2020-12-18 PROCEDURE — 97530 THERAPEUTIC ACTIVITIES: CPT

## 2020-12-18 PROCEDURE — 97163 PT EVAL HIGH COMPLEX 45 MIN: CPT

## 2020-12-18 PROCEDURE — 76000 FLUOROSCOPY <1 HR PHYS/QHP: CPT

## 2020-12-18 PROCEDURE — 97116 GAIT TRAINING THERAPY: CPT

## 2020-12-18 PROCEDURE — C1889: CPT

## 2020-12-18 PROCEDURE — 97110 THERAPEUTIC EXERCISES: CPT

## 2020-12-18 PROCEDURE — C1713: CPT

## 2020-12-18 RX ORDER — OMEPRAZOLE 10 MG/1
1 CAPSULE, DELAYED RELEASE ORAL
Qty: 28 | Refills: 0
Start: 2020-12-18 | End: 2021-01-14

## 2020-12-18 RX ORDER — METHOCARBAMOL 500 MG/1
1 TABLET, FILM COATED ORAL
Qty: 9 | Refills: 0
Start: 2020-12-18 | End: 2020-12-20

## 2020-12-18 RX ORDER — SENNOSIDES/DOCUSATE SODIUM 8.6MG-50MG
2 TABLET ORAL
Qty: 14 | Refills: 0
Start: 2020-12-18 | End: 2020-12-24

## 2020-12-18 RX ORDER — OXYCODONE HYDROCHLORIDE 5 MG/1
1 TABLET ORAL
Qty: 42 | Refills: 0
Start: 2020-12-18 | End: 2020-12-24

## 2020-12-18 RX ORDER — ASPIRIN/CALCIUM CARB/MAGNESIUM 324 MG
1 TABLET ORAL
Qty: 28 | Refills: 0
Start: 2020-12-18 | End: 2021-01-14

## 2020-12-18 RX ADMIN — Medication 975 MILLIGRAM(S): at 00:40

## 2020-12-18 RX ADMIN — HYDROMORPHONE HYDROCHLORIDE 4 MILLIGRAM(S): 2 INJECTION INTRAMUSCULAR; INTRAVENOUS; SUBCUTANEOUS at 05:15

## 2020-12-18 RX ADMIN — Medication 325 MILLIGRAM(S): at 13:01

## 2020-12-18 RX ADMIN — CELECOXIB 200 MILLIGRAM(S): 200 CAPSULE ORAL at 06:06

## 2020-12-18 RX ADMIN — HYDROMORPHONE HYDROCHLORIDE 4 MILLIGRAM(S): 2 INJECTION INTRAMUSCULAR; INTRAVENOUS; SUBCUTANEOUS at 04:19

## 2020-12-18 RX ADMIN — PANTOPRAZOLE SODIUM 40 MILLIGRAM(S): 20 TABLET, DELAYED RELEASE ORAL at 06:06

## 2020-12-18 RX ADMIN — Medication 975 MILLIGRAM(S): at 06:06

## 2020-12-18 RX ADMIN — HYDROMORPHONE HYDROCHLORIDE 4 MILLIGRAM(S): 2 INJECTION INTRAMUSCULAR; INTRAVENOUS; SUBCUTANEOUS at 10:45

## 2020-12-18 RX ADMIN — METHOCARBAMOL 750 MILLIGRAM(S): 500 TABLET, FILM COATED ORAL at 06:07

## 2020-12-18 RX ADMIN — HYDROMORPHONE HYDROCHLORIDE 4 MILLIGRAM(S): 2 INJECTION INTRAMUSCULAR; INTRAVENOUS; SUBCUTANEOUS at 10:23

## 2020-12-18 RX ADMIN — CELECOXIB 200 MILLIGRAM(S): 200 CAPSULE ORAL at 07:00

## 2020-12-18 RX ADMIN — Medication 975 MILLIGRAM(S): at 07:00

## 2020-12-18 RX ADMIN — HYDROMORPHONE HYDROCHLORIDE 4 MILLIGRAM(S): 2 INJECTION INTRAMUSCULAR; INTRAVENOUS; SUBCUTANEOUS at 15:59

## 2020-12-18 RX ADMIN — Medication 975 MILLIGRAM(S): at 13:00

## 2020-12-18 RX ADMIN — Medication 975 MILLIGRAM(S): at 13:01

## 2020-12-18 RX ADMIN — METHOCARBAMOL 750 MILLIGRAM(S): 500 TABLET, FILM COATED ORAL at 13:01

## 2020-12-18 NOTE — PROGRESS NOTE ADULT - SUBJECTIVE AND OBJECTIVE BOX
SULAIMAN BAILEY  [id number]    SUBJECTIVE: Patient is a 50y male s/p PSF L5-S1 on 12/16, POD #2. Patient seen and examined. Patient is doing well and resting comfortably. Pain is being well controlled with prescribed pain medications. Patient has been participating in PT and ambulating with assistance. Patient states that right hand paresthesia of his 1st and 2nd digit is improving - denies motor dysfunction of this extremity. Patient denies fever/chills, SOB, CP, nausea, vomiting, numbness/tingling to rest of extremities, headaches, dizziness. Patient denies acute motor or sensory changes. No other orthopedic complaints.       OBJECTIVE:     Vital Signs Last 24 Hrs  T(C): 36.8 (18 Dec 2020 07:40), Max: 36.8 (18 Dec 2020 04:20)  T(F): 98.2 (18 Dec 2020 07:40), Max: 98.3 (18 Dec 2020 04:20)  HR: 83 (18 Dec 2020 07:40) (73 - 83)  BP: 129/79 (18 Dec 2020 07:40) (96/58 - 129/79)  BP(mean): --  RR: 18 (18 Dec 2020 07:40) (17 - 18)  SpO2: 91% (18 Dec 2020 07:40) (91% - 98%)  I&O's Summary    17 Dec 2020 07:01  -  18 Dec 2020 07:00  --------------------------------------------------------  IN: 0 mL / OUT: 920 mL / NET: -920 mL        Constitutional: Pleasant in no acute distress  Psych:A&Ox3  Abdominal: soft and supple non distended  Spine:          Dressing: dry and intact. Dressing to be changed when drain is pulled.                             PETRA drain in place, patent. Output: 90cc over past 12 hours. 270cc over the past 24 hours.                 Sensation:            Upper extremity          grossly intact manually on the left. On the right, radial nerve distribution paresthesia of first and second digit - improvement per patient.          Lower extremity           grossly intact manually                               Motor:         Lower extremity                        HF(l2)   KE(l3)    TA(l4)   EHL(l5)     GS(s1)                                                 R        5/5        5/5        5/5       5/5         5/5                                               L         5/5        5/5       5/5       5/5          5/5                                                        [x] warm well perfused; capillary refill <3 seconds bilaterally. Calf soft NT. +2 DP pulse.            LABS:                        12.8   9.99  )-----------( 221      ( 18 Dec 2020 07:06 )             38.6       12-18    139  |  105  |  8.0  ----------------------------<  109<H>  3.9   |  25.0  |  0.93    Ca    8.8      18 Dec 2020 07:06      A/P : 50y Male s/p PSF L5-S1,  POD#2  -    Pain control as clinically indicated  -    DVT ppx: [ x]SCDs[ x] Pharmacolgic   mg once daily x 28 days  -    Monitor Drain Output, can discontinue drain when output equal or less than 10 cc/hr/12 hr.  Will reeval at noon - dressing change when drain pulled.  -    PT WBAT, balance and gait   -   DM 2- diabetic diet  -    Paresthesia of right wrist and hand- reassurance should be given that this symptom will subside with time, likely due to arterial line placement and/or intraoperative positioning. Improving.  -    Probable home today vs tomorrow    SULAIMAN BAILEY  [id number]    SUBJECTIVE: Patient is a 50y male s/p PSF L5-S1 on 12/16, POD #2. Patient seen and examined. Patient is doing well and resting comfortably. Pain is being well controlled with prescribed pain medications. Patient has been participating in PT and ambulating with assistance. Patient states that right hand paresthesia of his 1st and 2nd digit is improving - denies motor dysfunction of this extremity. Patient denies fever/chills, SOB, CP, nausea, vomiting, numbness/tingling to rest of extremities, headaches, dizziness. No other orthopedic complaints.       OBJECTIVE:     Vital Signs Last 24 Hrs  T(C): 36.8 (18 Dec 2020 07:40), Max: 36.8 (18 Dec 2020 04:20)  T(F): 98.2 (18 Dec 2020 07:40), Max: 98.3 (18 Dec 2020 04:20)  HR: 83 (18 Dec 2020 07:40) (73 - 83)  BP: 129/79 (18 Dec 2020 07:40) (96/58 - 129/79)  BP(mean): --  RR: 18 (18 Dec 2020 07:40) (17 - 18)  SpO2: 91% (18 Dec 2020 07:40) (91% - 98%)  I&O's Summary    17 Dec 2020 07:01  -  18 Dec 2020 07:00  --------------------------------------------------------  IN: 0 mL / OUT: 920 mL / NET: -920 mL        Constitutional: Pleasant in no acute distress  Psych:A&Ox3  Abdominal: soft and supple non distended  Spine:          Dressing: dry and intact. Dressing to be changed when drain is pulled.                             PETRA drain in place, patent. Output: 90cc over past 12 hours. 270cc over the past 24 hours.                 Sensation:            Upper extremity          grossly intact manually on the left. On the right, radial nerve distribution paresthesia of first and second digit - improvement per patient.          Lower extremity           grossly intact manually                               Motor:         Lower extremity                        HF(l2)   KE(l3)    TA(l4)   EHL(l5)     GS(s1)                                                 R        5/5        5/5        5/5       5/5         5/5                                               L         5/5        5/5       5/5       5/5          5/5                                                        [x] warm well perfused; capillary refill <3 seconds bilaterally. Calf soft NT. +2 DP pulse.            LABS:                        12.8   9.99  )-----------( 221      ( 18 Dec 2020 07:06 )             38.6       12-18    139  |  105  |  8.0  ----------------------------<  109<H>  3.9   |  25.0  |  0.93    Ca    8.8      18 Dec 2020 07:06      A/P : 50y Male s/p PSF L5-S1,  POD#2  -    Pain control as clinically indicated  -    DVT ppx: [ x]SCDs[ x] Pharmacolgic   mg once daily x 28 days  -    Monitor Drain Output, can discontinue drain when output equal or less than 10 cc/hr/12 hr.  Will reeval at noon - dressing change when drain pulled.  -    PT WBAT, balance and gait   -   DM 2- diabetic diet  -    Paresthesia of right wrist and hand- reassurance should be given that this symptom will subside with time, likely due to arterial line placement and/or intraoperative positioning. Improving.  -    Probable home today vs tomorrow

## 2020-12-18 NOTE — PROGRESS NOTE ADULT - ASSESSMENT
49 y/o with NIDDM Type 2, chronic back pain, GERD,  OA, lumbar herniated disc .  Pt report lower back pain that radiates to bilateral lower extremities pain, left leg pain worse with ambulation, described pain as burning sensation that radiates from back to bilateral thigh, legs, intermittent numbness and tingling of both legs ,  back pain started around 2013, pain has progressively gotten worse for the past two years and  became unbearable since March of this year.    Report prior conversational treatment of physical therapy and pain management with steroid injections 3x with poor efficacy. Pt requires use of a cane for mobility, report pain sometimes relieved with rest, muscle relaxant and constant reposition.  Now s/p Lumbar fusion .        Problem/Recommendation - 1:  Problem: Spondylolisthesis, lumbosacral region. Recommendation: s/p Lumbar fusion POD#2 ,   PT/OT/pain mgmt- as per Ortho  DVT prophylaxis- as per ortho  Incentive spirometry  Prophylaxis of opioid  induced constipation.   PETRA as per ortho.     Problem/Recommendation - 2:  ·  Problem: History of syncope.  Recommendation: - follows with cardiologist.      Problem/Recommendation - 3:  ·  Problem: Type 2 diabetes mellitus without complication, without long-term current use of insulin.  Recommendation:, ADA diet ,   ISSC , accu checks. Resume Metformin on dc     Problem/Recommendation - 4:  ·  Problem: Adrenal abnormality.  Recommendation: - noted on CT done as on outpatient ,  likely incidental L adrenal  adenoma 3.2 cm , discussed with patient, he states he has no prior knowledge, adrenal protocol CT or MRI is recommended for confirmation to be performed and followed  up as outpatient with PMD     Problem/Recommendation - 5:  ·  Problem: Need for prophylactic measure.  Recommendation: DVT prophylaxis  - ASA 325mg daily is OK from ortho stand point   Opioid induced constipation  prophylaxis - bowel regimen.      Problem/Recommendation - 6:  Problem: Gastroesophageal reflux disease, unspecified whether esophagitis present. Recommendation: - continue PPI home dose .    Problem/Recommendation-7:  Problem: Leukocytosis. Plan: Probably reactive, no fever. Monitor CBC. WBC normalized    Problem/Recommendation-8:  Problem: ABLA. Recommendation_ Monitor H&H, PETRA drain output . H&H stable.  51 y/o with NIDDM Type 2, chronic back pain, GERD,  OA, lumbar herniated disc .  Pt report lower back pain that radiates to bilateral lower extremities pain, left leg pain worse with ambulation, described pain as burning sensation that radiates from back to bilateral thigh, legs, intermittent numbness and tingling of both legs ,  back pain started around 2013, pain has progressively gotten worse for the past two years and  became unbearable since March of this year.    Report prior conversational treatment of physical therapy and pain management with steroid injections 3x with poor efficacy. Pt requires use of a cane for mobility, report pain sometimes relieved with rest, muscle relaxant and constant reposition.  Now s/p Lumbar fusion .        Problem/Recommendation - 1:  Problem: Spondylolisthesis, lumbosacral region. Recommendation: s/p Lumbar fusion POD#2 ,   PT/OT/pain mgmt- as per Ortho  DVT prophylaxis- as per ortho  Incentive spirometry  Prophylaxis of opioid  induced constipation.   PETRA as per ortho.     Problem/Recommendation - 2:  ·  Problem: History of syncope.  Recommendation: - follows with cardiologist.      Problem/Recommendation - 3:  ·  Problem: Type 2 diabetes mellitus without complication, without long-term current use of insulin.  Recommendation:, ADA diet ,   ISSC , accu checks. Resume Metformin on dc     Problem/Recommendation - 4:  ·  Problem: Adrenal abnormality.  Recommendation: - noted on CT done as on outpatient ,  likely incidental L adrenal  adenoma 3.2 cm , discussed with patient, he states he has no prior knowledge, adrenal protocol CT or MRI is recommended for confirmation to be performed and followed  up as outpatient with PMD     Problem/Recommendation - 5:  ·  Problem: Need for prophylactic measure.  Recommendation: DVT prophylaxis  - ASA 325mg daily is OK from ortho stand point   Opioid induced constipation  prophylaxis - bowel regimen.      Problem/Recommendation - 6:  Problem: Gastroesophageal reflux disease, unspecified whether esophagitis present. Recommendation: - continue PPI home dose .    Problem/Recommendation-7:  Problem: Leukocytosis. Plan: Probably reactive, no fever. Monitor CBC. WBC normalized    Problem/Recommendation-8:  Problem: Acute Blood Loss Anemia 2/2 postoperative state. Recommendation_ Monitor H&H, PETRA drain output . H&H stable.

## 2020-12-18 NOTE — PROGRESS NOTE ADULT - ATTENDING COMMENTS
Attending statement:  I have personally seen this patient, and formed a face to face diagnostic evaluation on this patient on this date.  I have reviewed the PA, NP and or Medical/PA student and/or Resident documentation and agree with the history, physical exam and plan of care except if noted otherwise.
pt seen and examined at bedside   50yr old with lumbar spondylopathy s/p Lumbar fusion POD#2   Doing well. Pain is not very controlled at this time, was very comfortable through yesterday and  overnight. Urinating without any issues  Awaiting hemovac removal - still draining sig amounts. monitor H&H   vitals, labs reviewed  Agree with above   Dr Perez to follow in am
pt seen and examined at bedside   50yr old with lumbar spondylopathy s/p Lumbar fusion POD#1  Doing well. Pain is fairly controlled   vitals, labs reviewed  Agree with above   Will follow   watch for urinary retention. bladder scan to check PVR.

## 2020-12-18 NOTE — PROGRESS NOTE ADULT - SUBJECTIVE AND OBJECTIVE BOX
PETRA drainage serosangenous 30 ccs from 10:30 a.m. - 3pm. D/w Dr. Khalil. I removed PETRA drain without complication. Incision healing well, streristrips intact, no s/o infx, no bleeding or drainage on dressing which was also removed.  Drain site cleansed with betadine swab and dried with sterile gauze. Dry gauze dressing placed over incision and drain site. Patient Discharged to home.

## 2020-12-18 NOTE — CDI QUERY NOTE - NSCDINOTEPOA_GEN_ALL_CORE_FT
Was the condition present on admission? If so, please document in the chart that “(the condition) was present on admission.”
---

## 2020-12-22 ENCOUNTER — APPOINTMENT (OUTPATIENT)
Dept: ORTHOPEDIC SURGERY | Facility: CLINIC | Age: 50
End: 2020-12-22
Payer: MEDICAID

## 2020-12-22 PROBLEM — Z91.89 OTHER SPECIFIED PERSONAL RISK FACTORS, NOT ELSEWHERE CLASSIFIED: Chronic | Status: ACTIVE | Noted: 2020-12-02

## 2020-12-22 PROBLEM — Z87.898 PERSONAL HISTORY OF OTHER SPECIFIED CONDITIONS: Chronic | Status: ACTIVE | Noted: 2020-12-02

## 2020-12-22 PROBLEM — M43.17 SPONDYLOLISTHESIS, LUMBOSACRAL REGION: Chronic | Status: ACTIVE | Noted: 2020-12-02

## 2020-12-22 PROBLEM — E11.9 TYPE 2 DIABETES MELLITUS WITHOUT COMPLICATIONS: Chronic | Status: ACTIVE | Noted: 2020-12-02

## 2020-12-22 PROCEDURE — 72100 X-RAY EXAM L-S SPINE 2/3 VWS: CPT

## 2020-12-22 PROCEDURE — 99024 POSTOP FOLLOW-UP VISIT: CPT

## 2020-12-22 NOTE — HISTORY OF PRESENT ILLNESS
[___ Weeks Post Op] : [unfilled] weeks post op [Clean/Dry/Intact] : clean, dry and intact [Healed] : healed [Neuro Intact] : an unremarkable neurological exam [Vascular Intact] : ~T peripheral vascular exam normal [Xray (Date:___)] : [unfilled] Xray -  [Doing Well] : is doing well [Excellent Pain Control] : has excellent pain control [No Sign of Infection] : is showing no signs of infection [Chills] : no chills [Fever] : no fever [Erythema] : not erythematous [Discharge] : absent of discharge [Swelling] : not swollen [Dehiscence] : not dehisced [de-identified] : S/p L5-S1 posterior instrumented fusion with interbody and laminectomy at L5. DOS: 12/16/2020 [de-identified] : 50 year old M S/p posterior instrumented fusion with interbody. Patient complaints of mild left calf spasms, he has been having a lot of gas which I informed him was normal due to anesthesia. States his pre operative pain is much improved.  [de-identified] : constitutional- No acute distress\par neurologic- no LE radiculitis.\par skin- incision dry clean and intact. the incision was well healed. Incision was cleansed with Chlora-prep and a new dressing was applied. \par musculoskeletal - motor strength is 5/5.  [de-identified] : Xray of the lumbar spine taken 12/22/2020 demonstrates S/p L5-S1 instrumented fusion. appears to be improvement of the spondylolisthesis, instrumentation appears well positioned. [de-identified] : S/p L5-S1 posterior fusion.  [de-identified] : The patient will follow up in 3 weeks for a repeat clinical assessment.

## 2020-12-22 NOTE — ADDENDUM
[FreeTextEntry1] : Documented by Fernie Salgado acting as a scribe for Malaika Morales on 12/03/2020. All medical record entries made by the Scribe were at my, Malaika Morales, direction and personally dictated by me on 12/03/2020 . I have reviewed the chart and agree that the record accurately reflects my personal performance of the history, physical exam, assessment and plan. I have also personally directed, reviewed, and agreed with the chart.

## 2021-01-01 NOTE — ED PROVIDER NOTE - NS HIV RISK FACTOR YES
Declined
[facility]  PEDIATRICS  [address], [address]  [place] [address]  Phone: [phone]  Fax: [phone]  Follow Up Time: 1-3 days

## 2021-01-19 NOTE — DISCHARGE NOTE NURSING/CASE MANAGEMENT/SOCIAL WORK - FLU SEASON?
CC: Monoclonal Antibody Infusion/COVID 19 Positive  81yFemale with PMHx HTN, HLD, s/p cardiac stent,  and symptoms of malaise, sore throat, cough    exam/findings:  T(C): 37 (01-19-21 @ 15:31), Max: 37 (01-19-21 @ 15:31)  HR: 92 (01-19-21 @ 15:31) (92 - 92)  BP: 176/80 (01-19-21 @ 15:31) (176/80 - 176/80)  RR: 18 (01-19-21 @ 15:31) (18 - 18)  SpO2: 100% (01-19-21 @ 15:31) (100% - 100%)      PE:   Appearance: NAD	  HEENT:   Normal oral mucosa,   Lymphatic: No lymphadenopathy  Cardiovascular: Normal S1 S2, No JVD, No murmurs, No edema  Respiratory: Lungs clear to auscultation	  Gastrointestinal:  Soft, Non-tender, + BS	  Skin: warm and dry  Neurologic: Non-focal  Extremities: Normal range of motion, no calf tenderness or edema    ASSESSMENT:  Pt is a 81y with PMHx of HTN, HLD, s/p cardiac stent, s/p TAVR Covid 19 Positive with symptoms in the last 10 days, who presents to infusion center for Monoclonal antibody infusion Bamlanivimab  Symptoms/ Criteria: malaise, sore throat, cough  Risk Profile includes: age >65, HTN, CV disease    PLAN:  - infusion procedure explained to patient   -Consent for monoclonal antibody infusion obtained   - Risk & benifits discussed/all questions answered  -infuse  Bamlanivimab  700mg IV over one hour   -observe patient for one hour post infusion      I have reviewed the Bamlanivimab Emergency Use Authorization (EAU) and I have provided the patient or patient's caregiver with the following information:  1. FDA has authorized emergency use of Bamlanivimab, which is not FDA-approved biologic product.  2. The patient or patient's caregiver has the option to accept or refuse administration of Bamlanivimab  3. The significant known and benefits are unknown.  4. Information on available alternative treatments and risks and benefits of those alternatives.    Discharge:  Patient tolerated infusion well denies complaints of chest pain/SOB/dizziness/ palps  Vital signs stable  D/C instructions given/ fact sheet included.  Patient to follow-up with PCP as needed. Yes...

## 2021-01-20 ENCOUNTER — APPOINTMENT (OUTPATIENT)
Dept: FAMILY MEDICINE | Facility: CLINIC | Age: 51
End: 2021-01-20

## 2021-01-29 ENCOUNTER — APPOINTMENT (OUTPATIENT)
Dept: DERMATOLOGY | Facility: CLINIC | Age: 51
End: 2021-01-29
Payer: MEDICAID

## 2021-01-29 PROCEDURE — 99203 OFFICE O/P NEW LOW 30 MIN: CPT

## 2021-01-29 PROCEDURE — 99072 ADDL SUPL MATRL&STAF TM PHE: CPT

## 2021-02-02 ENCOUNTER — APPOINTMENT (OUTPATIENT)
Dept: ORTHOPEDIC SURGERY | Facility: CLINIC | Age: 51
End: 2021-02-02

## 2021-02-23 ENCOUNTER — APPOINTMENT (OUTPATIENT)
Dept: ORTHOPEDIC SURGERY | Facility: CLINIC | Age: 51
End: 2021-02-23

## 2021-03-02 ENCOUNTER — APPOINTMENT (OUTPATIENT)
Dept: ORTHOPEDIC SURGERY | Facility: CLINIC | Age: 51
End: 2021-03-02
Payer: MEDICAID

## 2021-03-02 VITALS
WEIGHT: 237 LBS | BODY MASS INDEX: 35.1 KG/M2 | HEIGHT: 69 IN | DIASTOLIC BLOOD PRESSURE: 89 MMHG | HEART RATE: 90 BPM | SYSTOLIC BLOOD PRESSURE: 136 MMHG

## 2021-03-02 VITALS — TEMPERATURE: 96.2 F

## 2021-03-02 PROCEDURE — 99024 POSTOP FOLLOW-UP VISIT: CPT

## 2021-03-02 PROCEDURE — 72100 X-RAY EXAM L-S SPINE 2/3 VWS: CPT

## 2021-03-02 NOTE — HISTORY OF PRESENT ILLNESS
[___ Weeks Post Op] : [unfilled] weeks post op [Clean/Dry/Intact] : clean, dry and intact [Healed] : healed [Neuro Intact] : an unremarkable neurological exam [Vascular Intact] : ~T peripheral vascular exam normal [Xray (Date:___)] : [unfilled] Xray -  [Doing Well] : is doing well [Excellent Pain Control] : has excellent pain control [No Sign of Infection] : is showing no signs of infection [Chills] : no chills [Fever] : no fever [Erythema] : not erythematous [Discharge] : absent of discharge [Swelling] : not swollen [Dehiscence] : not dehisced [de-identified] : S/p L5-S1 posterior instrumented fusion with interbody and laminectomy at L5. DOS: 12/16/2020 [de-identified] : 50 year old M S/p posterior instrumented fusion with interbody. Overall he is doing slightly better but he still has pre operative pain in the lower back and BL thighs. He notices that when the weather changes the pain does as well, he appreciates good and bad days but feels he is headed in the right direction.  [de-identified] : constitutional- No acute distress\par neurologic- no LE radiculitis.\par skin- incision dry clean and intact. The incision was well healed. \par musculoskeletal - motor strength is 5/5.  [de-identified] : Xray of the lumbar spine taken 03/02/2021 demonstrates  S/p L5-S1 instrumented fusion, appears to be improvement of the spondylolisthesis, instrumentation appears well positioned. [de-identified] : S/p L5-S1 posterior fusion.  [de-identified] : Patient was instructed to start home exercises, core strengthening and a sheet was provided. I advised the patient on antiinflammatory use. We also spoke about the benefits of using heat, ice, and Bengay cream. I explained during the first year of fusion this on and off pain is common and should improve with time. Patient will follow up in three months for repeat clinical assessment at which time we will obtain new images.

## 2021-03-11 ENCOUNTER — RESULT REVIEW (OUTPATIENT)
Age: 51
End: 2021-03-11

## 2021-03-11 ENCOUNTER — APPOINTMENT (OUTPATIENT)
Dept: FAMILY MEDICINE | Facility: CLINIC | Age: 51
End: 2021-03-11
Payer: MEDICAID

## 2021-03-11 VITALS
HEIGHT: 69 IN | TEMPERATURE: 97 F | DIASTOLIC BLOOD PRESSURE: 80 MMHG | SYSTOLIC BLOOD PRESSURE: 126 MMHG | OXYGEN SATURATION: 98 % | HEART RATE: 75 BPM | WEIGHT: 238 LBS | BODY MASS INDEX: 35.25 KG/M2

## 2021-03-11 DIAGNOSIS — Z23 ENCOUNTER FOR IMMUNIZATION: ICD-10-CM

## 2021-03-11 DIAGNOSIS — Z13.29 ENCOUNTER FOR SCREENING FOR OTHER SUSPECTED ENDOCRINE DISORDER: ICD-10-CM

## 2021-03-11 DIAGNOSIS — Z87.898 PERSONAL HISTORY OF OTHER SPECIFIED CONDITIONS: ICD-10-CM

## 2021-03-11 DIAGNOSIS — Z00.00 ENCOUNTER FOR GENERAL ADULT MEDICAL EXAMINATION W/OUT ABNORMAL FINDINGS: ICD-10-CM

## 2021-03-11 DIAGNOSIS — Z13.21 ENCOUNTER FOR SCREENING FOR NUTRITIONAL DISORDER: ICD-10-CM

## 2021-03-11 DIAGNOSIS — Z13.220 ENCOUNTER FOR SCREENING FOR LIPOID DISORDERS: ICD-10-CM

## 2021-03-11 DIAGNOSIS — Z11.59 ENCOUNTER FOR SCREENING FOR OTHER VIRAL DISEASES: ICD-10-CM

## 2021-03-11 DIAGNOSIS — M43.17 SPONDYLOLISTHESIS, LUMBOSACRAL REGION: ICD-10-CM

## 2021-03-11 DIAGNOSIS — R07.89 OTHER CHEST PAIN: ICD-10-CM

## 2021-03-11 PROCEDURE — 99072 ADDL SUPL MATRL&STAF TM PHE: CPT

## 2021-03-11 PROCEDURE — G0444 DEPRESSION SCREEN ANNUAL: CPT

## 2021-03-11 PROCEDURE — G0442 ANNUAL ALCOHOL SCREEN 15 MIN: CPT

## 2021-03-11 PROCEDURE — 99386 PREV VISIT NEW AGE 40-64: CPT | Mod: 25

## 2021-03-11 NOTE — HISTORY OF PRESENT ILLNESS
[FreeTextEntry1] : NP-CPE [de-identified] : 49 yo male with pmhx of T2DM, degenerative disc disease presents for annual physical. No acute complaints. Denies fever, chills, cp, palpitations, sob, nv, heat/cold intolerance, dizziness, melena, hematochezia, muscle weakness, loss of sensation, bowel/bladder incontinence or calf pain.\par

## 2021-03-11 NOTE — ASSESSMENT
[FreeTextEntry1] : Annual physical: f/u routine labwork\par T2DM: f/u a1c, recommend low carb diet, wt loss, exercise, c/w metformin 500 mg po bid, refer to ophthalmology\par S/p lumbar infusion/spondylolisthesis L5/S1: f/u ortho, Dr. Khalil\par BMI 35: recommend low fat diet, wt loss, exercise, f/u lipid panel\par Adrenal gland lesion: incidentally found on CT, pt asymptomatic, f/u MR abd with IV contrast adrenal protocol\par Screen for colon CA/Chronic GERD: refer to GI for EGD/Colonoscopy\par Hepatic steatosis: currently asymptomatic, recommend low fat diet, wt loss, exercise, f/u LFTs\par B/l knee pain: chronic, likely OA, f/u xray, start Tylenol 650 mg po q6h prn for pain\par RTC 2 - 3 wks

## 2021-03-11 NOTE — HEALTH RISK ASSESSMENT
[Very Good] : ~his/her~  mood as very good [0-5] : 0-5 [1 or 2 (0 pts)] : 1 or 2 (0 points) [Never (0 pts)] : Never (0 points) [No] : In the past 12 months have you used drugs other than those required for medical reasons? No [No falls in past year] : Patient reported no falls in the past year [0] : 2) Feeling down, depressed, or hopeless: Not at all (0) [Patient reported colonoscopy was normal] : Patient reported colonoscopy was normal [HIV test declined] : HIV test declined [Hepatitis C test declined] : Hepatitis C test declined [With Family] : lives with family [Employed] : employed [] :  [Sexually Active] : sexually active [Feels Safe at Home] : Feels safe at home [Fully functional (bathing, dressing, toileting, transferring, walking, feeding)] : Fully functional (bathing, dressing, toileting, transferring, walking, feeding) [Fully functional (using the telephone, shopping, preparing meals, housekeeping, doing laundry, using] : Fully functional and needs no help or supervision to perform IADLs (using the telephone, shopping, preparing meals, housekeeping, doing laundry, using transportation, managing medications and managing finances) [] : No [de-identified] : smoked for 4 yrs, 1/2 ppd [YearQuit] : 1984 [Audit-CScore] : 0 [de-identified] : needs improvement [de-identified] : needs improvement [COS0Buxrn] : 0 [High Risk Behavior] : no high risk behavior [Reports changes in hearing] : Reports no changes in hearing [Reports changes in vision] : Reports no changes in vision [Reports changes in dental health] : Reports no changes in dental health [ColonoscopyDate] : 01/17

## 2021-03-18 LAB
25(OH)D3 SERPL-MCNC: 15 NG/ML
ALBUMIN SERPL ELPH-MCNC: 4.3 G/DL
ALP BLD-CCNC: 117 U/L
ALT SERPL-CCNC: 24 U/L
ANION GAP SERPL CALC-SCNC: 14 MMOL/L
APPEARANCE: CLEAR
AST SERPL-CCNC: 22 U/L
BACTERIA: NEGATIVE
BASOPHILS # BLD AUTO: 0.03 K/UL
BASOPHILS NFR BLD AUTO: 0.5 %
BILIRUB SERPL-MCNC: <0.2 MG/DL
BILIRUBIN URINE: NEGATIVE
BLOOD URINE: NEGATIVE
BUN SERPL-MCNC: 12 MG/DL
CALCIUM OXALATE CRYSTALS: ABNORMAL
CALCIUM SERPL-MCNC: 9.3 MG/DL
CHLORIDE SERPL-SCNC: 107 MMOL/L
CHOLEST SERPL-MCNC: 162 MG/DL
CO2 SERPL-SCNC: 20 MMOL/L
COLOR: NORMAL
CREAT SERPL-MCNC: 0.96 MG/DL
CREAT SPEC-SCNC: 160 MG/DL
EOSINOPHIL # BLD AUTO: 0.16 K/UL
EOSINOPHIL NFR BLD AUTO: 2.6 %
ESTIMATED AVERAGE GLUCOSE: 143 MG/DL
GLUCOSE QUALITATIVE U: NEGATIVE
GLUCOSE SERPL-MCNC: 93 MG/DL
HBA1C MFR BLD HPLC: 6.6 %
HCT VFR BLD CALC: 43.7 %
HDLC SERPL-MCNC: 47 MG/DL
HGB BLD-MCNC: 13.8 G/DL
HYALINE CASTS: 0 /LPF
IMM GRANULOCYTES NFR BLD AUTO: 0.2 %
KETONES URINE: NEGATIVE
LDLC SERPL CALC-MCNC: 84 MG/DL
LEUKOCYTE ESTERASE URINE: NEGATIVE
LYMPHOCYTES # BLD AUTO: 1.92 K/UL
LYMPHOCYTES NFR BLD AUTO: 30.9 %
MAN DIFF?: NORMAL
MCHC RBC-ENTMCNC: 30.9 PG
MCHC RBC-ENTMCNC: 31.6 GM/DL
MCV RBC AUTO: 98 FL
MICROALBUMIN 24H UR DL<=1MG/L-MCNC: <1.2 MG/DL
MICROALBUMIN/CREAT 24H UR-RTO: NORMAL MG/G
MICROSCOPIC-UA: NORMAL
MONOCYTES # BLD AUTO: 0.55 K/UL
MONOCYTES NFR BLD AUTO: 8.9 %
NEUTROPHILS # BLD AUTO: 3.54 K/UL
NEUTROPHILS NFR BLD AUTO: 56.9 %
NITRITE URINE: NEGATIVE
NONHDLC SERPL-MCNC: 115 MG/DL
PH URINE: 6
PLATELET # BLD AUTO: 289 K/UL
POTASSIUM SERPL-SCNC: 4.6 MMOL/L
PROT SERPL-MCNC: 7.1 G/DL
PROTEIN URINE: NORMAL
RBC # BLD: 4.46 M/UL
RBC # FLD: 13.9 %
RED BLOOD CELLS URINE: 3 /HPF
SARS-COV-2 IGG SERPL IA-ACNC: 0.08 INDEX
SARS-COV-2 IGG SERPL QL IA: NEGATIVE
SODIUM SERPL-SCNC: 141 MMOL/L
SPECIFIC GRAVITY URINE: 1.03
SQUAMOUS EPITHELIAL CELLS: 0 /HPF
TRIGL SERPL-MCNC: 157 MG/DL
TSH SERPL-ACNC: 1.26 UIU/ML
UROBILINOGEN URINE: NORMAL
WBC # FLD AUTO: 6.21 K/UL
WHITE BLOOD CELLS URINE: 0 /HPF

## 2021-03-24 ENCOUNTER — APPOINTMENT (OUTPATIENT)
Dept: ORTHOPEDIC SURGERY | Facility: CLINIC | Age: 51
End: 2021-03-24

## 2021-04-01 ENCOUNTER — APPOINTMENT (OUTPATIENT)
Dept: FAMILY MEDICINE | Facility: CLINIC | Age: 51
End: 2021-04-01

## 2021-04-05 NOTE — CDI QUERY NOTE - NSCDIOTHERTXTBX_GEN_ALL_CORE_HH
CHF (congestive heart failure), NYHA class I Diabetes ABLA is not an approved [facility] abbreviations for coding, can you please clarify what it refers to?    A.	ABLA refers to acute blood loss anemia  B.	Other, please specify  C.	Not clinically significant    Supporting Documentations:    12/18/20 Progress Note Hospitalist:  Problem/Recommendation-8:  Problem: ABLA. Recommendation_ Monitor H&H, PETRA drain output . H&H stable.     [email or web address] Diabetes Diabetes Diabetes Diabetes CHF (congestive heart failure), NYHA class I Diabetes Diabetes Diabetes Diabetes Diabetes Diabetes Diabetes Diabetes

## 2021-04-22 ENCOUNTER — APPOINTMENT (OUTPATIENT)
Dept: FAMILY MEDICINE | Facility: CLINIC | Age: 51
End: 2021-04-22

## 2021-05-11 ENCOUNTER — APPOINTMENT (OUTPATIENT)
Dept: ORTHOPEDIC SURGERY | Facility: CLINIC | Age: 51
End: 2021-05-11

## 2021-06-17 ENCOUNTER — APPOINTMENT (OUTPATIENT)
Dept: ORTHOPEDIC SURGERY | Facility: CLINIC | Age: 51
End: 2021-06-17
Payer: MEDICAID

## 2021-06-17 VITALS
WEIGHT: 240 LBS | BODY MASS INDEX: 35.55 KG/M2 | SYSTOLIC BLOOD PRESSURE: 143 MMHG | DIASTOLIC BLOOD PRESSURE: 91 MMHG | HEIGHT: 69 IN | HEART RATE: 78 BPM

## 2021-06-17 PROCEDURE — 72100 X-RAY EXAM L-S SPINE 2/3 VWS: CPT

## 2021-06-17 PROCEDURE — 99214 OFFICE O/P EST MOD 30 MIN: CPT

## 2021-06-17 PROCEDURE — 99072 ADDL SUPL MATRL&STAF TM PHE: CPT

## 2021-06-17 NOTE — PHYSICAL EXAM
[Poor Appearance] : well-appearing [Acute Distress] : not in acute distress [de-identified] : CONSTITUTIONAL: The patient is a very pleasant individual who is well-nourished and who appears stated age.\par PSYCHIATRIC: The patient is alert and oriented X 3 and in no apparent distress, and participates with orthopedic evaluation well.\par HEAD: Atraumatic and is nonsyndromic in appearance.\par EENT: No visible thyromegaly, EOMI.\par RESPIRATORY: Respiratory rate is regular, not dyspneic on examination.\par LYMPHATICS: There is no inguinal lymphadenopathy\par INTEGUMENTARY: Skin is clean, dry, and intact about the bilateral lower extremities and lumbar spine. Well healed midline incision. \par VASCULAR: There is brisk capillary refill about the bilateral lower extremities.\par NEUROLOGIC: There are no pathologic reflexes. There is no decrease in sensation of the bilateral lower extremities on Wartenberg pinwheel examination. Deep tendon reflexes are well maintained at 2+/4 of the bilateral lower extremities and are symmetric. No radiculopathy \par MUSCULOSKELETAL: There is no visible muscular atrophy. Manual motor strength is well maintained in the bilateral lower extremities. Range of motion of lumbar spine is well maintained. The patient ambulates in a non-myelopathic manner. Negative tension sign and straight leg raise bilaterally. Quad extension, ankle dorsiflexion, EHL, plantar flexion, and ankle eversion are well preserved. Normal secondary orthopaedic exam of bilateral hips, greater trochanteric area, knees and ankles. No lower back pain.  [de-identified] : Xray of the lumbar spine taken 06/17/2021 demonstrates S/p L5-S1 fusion, no acute changes. \par \par Xray of the lumbar spine taken 12/22/2020 demonstrates S/p L5-S1 instrumented fusion. appears to be improvement of the spondylolisthesis, instrumentation appears well positioned. \par \par Xray of the lumbar spine taken 3//2020 demonstrates S/p L5-S1 instrumented fusion. appears to be improvement of the spondylolisthesis, instrumentation appears well positioned. \par \par pre op x rays reviewed with improved alignment post operatively

## 2021-06-17 NOTE — ADDENDUM
[FreeTextEntry1] : Documented by Fernie Salgado acting as a scribe for Dr. Jassi Khalil on 06/17/2021. All medical record entries made by the Scribe were at my, Dr. Jassi Khalil, direction and personally dictated by me on 06/17/2021 . I have reviewed the chart and agree that the record accurately reflects my personal performance of the history, physical exam, assessment and plan. I have also personally directed, reviewed, and agreed with the chart.

## 2021-06-17 NOTE — HISTORY OF PRESENT ILLNESS
[de-identified] : 50 year old M 6 months S/p posterior instrumented fusion with interbody. He states significant improvement in back pain and no BL LE pain. He states that his only complaint is stiffness when it rains. No incisional complaints at this time [Ataxia] : no ataxia [Incontinence] : no incontinence [Loss of Dexterity] : good dexterity [Urinary Ret.] : no urinary retention

## 2021-06-17 NOTE — DISCUSSION/SUMMARY
[de-identified] : Anticipatory guidance regarding S/p fusion was given. Patient was instructed to start home exercises, core strengthening and a sheet was provided. The patient will follow up in 6 months for a repeat clinical assessment with regard to his post surgery status. \par \par Prior to appointment and during encounter with patient extensive medical records were reviewed including but not limited to, hospital records, out patient records, imaging results, and lab data. During this appointment the patient was examined, diagnoses were discussed and explained in a face to face manner. In addition extensive time was spent reviewing aforementioned diagnostic studies. Counseling including abnormal image results, differential diagnoses, treatment options, risk and benefits, lifestyle changes, current condition, and current medications was performed. Patient's comments, questions, and concerns were address and patient verbalized understanding. Based on this patient's presentation at our office, which is an orthopedic spine surgeon's office, this patient inherently / intrinsically has a risk, however minute, of developing  issues such as Cauda equina syndrome, bowel and bladder changes, or progression of motor or neurological deficits such as paralysis which may be permanent.\par \par \par

## 2021-06-28 ENCOUNTER — RX RENEWAL (OUTPATIENT)
Age: 51
End: 2021-06-28

## 2021-07-07 ENCOUNTER — APPOINTMENT (OUTPATIENT)
Dept: ORTHOPEDIC SURGERY | Facility: CLINIC | Age: 51
End: 2021-07-07
Payer: MEDICAID

## 2021-07-07 VITALS
BODY MASS INDEX: 35.55 KG/M2 | HEIGHT: 69 IN | HEART RATE: 94 BPM | DIASTOLIC BLOOD PRESSURE: 88 MMHG | SYSTOLIC BLOOD PRESSURE: 149 MMHG | WEIGHT: 240 LBS

## 2021-07-07 PROCEDURE — 99213 OFFICE O/P EST LOW 20 MIN: CPT

## 2021-07-07 PROCEDURE — 99072 ADDL SUPL MATRL&STAF TM PHE: CPT

## 2021-07-22 ENCOUNTER — APPOINTMENT (OUTPATIENT)
Dept: ORTHOPEDIC SURGERY | Facility: CLINIC | Age: 51
End: 2021-07-22
Payer: MEDICAID

## 2021-07-22 VITALS
HEIGHT: 69 IN | DIASTOLIC BLOOD PRESSURE: 97 MMHG | BODY MASS INDEX: 35.55 KG/M2 | HEART RATE: 77 BPM | WEIGHT: 240 LBS | SYSTOLIC BLOOD PRESSURE: 154 MMHG

## 2021-07-22 DIAGNOSIS — E66.9 OBESITY, UNSPECIFIED: ICD-10-CM

## 2021-07-22 DIAGNOSIS — Z83.3 FAMILY HISTORY OF DIABETES MELLITUS: ICD-10-CM

## 2021-07-22 PROCEDURE — 72100 X-RAY EXAM L-S SPINE 2/3 VWS: CPT

## 2021-07-22 PROCEDURE — 99072 ADDL SUPL MATRL&STAF TM PHE: CPT

## 2021-07-22 PROCEDURE — 99214 OFFICE O/P EST MOD 30 MIN: CPT

## 2021-07-22 NOTE — ADDENDUM
[FreeTextEntry1] : Documented by Fernie Salgado acting as a scribe for Dr. Jassi Khalil on 07/22/2021. All medical record entries made by the Scribe were at my, Dr. Jassi Khalil, direction and personally dictated by me on 07/22/2021 . I have reviewed the chart and agree that the record accurately reflects my personal performance of the history, physical exam, assessment and plan. I have also personally directed, reviewed, and agreed with the chart.

## 2021-07-22 NOTE — HISTORY OF PRESENT ILLNESS
[de-identified] : 51 year old M Presents for follow up evaluation S/p lumbar fusion. He complains of persistent and worsening lower back pain, it appears to be a dysesthetic response in the BL thighs. Muscle relaxants relieve pain for a short period, he feels overall he is worsening.  [Ataxia] : no ataxia [Incontinence] : no incontinence [Loss of Dexterity] : good dexterity [Urinary Ret.] : no urinary retention

## 2021-07-22 NOTE — PHYSICAL EXAM
[Obese] : obese [Poor Appearance] : well-appearing [Acute Distress] : not in acute distress [de-identified] : CONSTITUTIONAL: The patient is a very pleasant individual who is well-nourished and who appears stated age.\par PSYCHIATRIC: The patient is alert and oriented X 3 and in no apparent distress, and participates with orthopedic evaluation well.\par HEAD: Atraumatic and is nonsyndromic in appearance.\par EENT: No visible thyromegaly, EOMI.\par RESPIRATORY: Respiratory rate is regular, not dyspneic on examination.\par LYMPHATICS: There is no inguinal lymphadenopathy\par INTEGUMENTARY: Skin is clean, dry, and intact about the bilateral lower extremities and lumbar spine.\par VASCULAR: There is brisk capillary refill about the bilateral lower extremities.\par NEUROLOGIC: There are no pathologic reflexes.  Deep tendon reflexes are well maintained at 2+/4 of the bilateral lower extremities and are symmetric. Complaints of BL thigh radiculopathy \par MUSCULOSKELETAL: There is no visible muscular atrophy. Manual motor strength is well maintained in the bilateral lower extremities. The patient ambulates in a non-myelopathic manner. Negative tension sign and straight leg raise bilaterally. Quad extension, ankle dorsiflexion, EHL, plantar flexion, and ankle eversion are well preserved. Normal secondary orthopaedic exam of bilateral hips, greater trochanteric area, knees and ankles. Mechanically oriented lower back pain and lumbar myositis.  [de-identified] : Xray of the lumbar spine taken 07/22/2021 demonstrates S/p L5-S1 lumbar fusion. \par \par Xray of the lumbar spine taken 06/17/2021 demonstrates S/p L5-S1 fusion, no acute changes. \par \par Xray of the lumbar spine taken 12/22/2020 demonstrates S/p L5-S1 instrumented fusion. appears to be improvement of the spondylolisthesis, instrumentation appears well positioned. \par \par Xray of the lumbar spine taken 3//2020 demonstrates S/p L5-S1 instrumented fusion. appears to be improvement of the spondylolisthesis, instrumentation appears well positioned. \par \par pre op x rays reviewed with improved alignment post operatively

## 2021-07-22 NOTE — DISCUSSION/SUMMARY
[de-identified] : We talked about the nature of the condition and treatment options. Anticipatory guidance regarding dysesthesias was given. A CT scan has been ordered and is medically necessary due to persistent pain despite surgical management, to assess the location of his implants, and to assess this dysesthesia response that is worsening. CT scan will help guide treatment plan, possible surgical intervention vs injection therapy with pain management. The patient will follow up after the CT scan results have been obtained. \par \par Prior to appointment and during encounter with patient extensive medical records were reviewed including but not limited to, hospital records, out patient records, imaging results, and lab data. During this appointment the patient was examined, diagnoses were discussed and explained in a face to face manner. In addition extensive time was spent reviewing aforementioned diagnostic studies. Counseling including abnormal image results, differential diagnoses, treatment options, risk and benefits, lifestyle changes, current condition, and current medications was performed. Patient's comments, questions, and concerns were address and patient verbalized understanding. Based on this patient's presentation at our office, which is an orthopedic spine surgeon's office, this patient inherently / intrinsically has a risk, however minute, of developing  issues such as Cauda equina syndrome, bowel and bladder changes, or progression of motor or neurological deficits such as paralysis which may be permanent.

## 2021-07-26 NOTE — ED ADULT NURSE NOTE - PRIMARY CARE PROVIDER
Banner Transposition Flap Text: The defect edges were debeveled with a #15 scalpel blade.  Given the location of the defect and the proximity to free margins a Banner transposition flap was deemed most appropriate.  Using a sterile surgical marker, an appropriate flap drawn around the defect. The area thus outlined was incised deep to adipose tissue with a #15 scalpel blade.  The skin margins were undermined to an appropriate distance in all directions utilizing iris scissors. [facility]

## 2021-08-05 ENCOUNTER — APPOINTMENT (OUTPATIENT)
Dept: FAMILY MEDICINE | Facility: CLINIC | Age: 51
End: 2021-08-05
Payer: MEDICAID

## 2021-08-05 ENCOUNTER — NON-APPOINTMENT (OUTPATIENT)
Age: 51
End: 2021-08-05

## 2021-08-05 ENCOUNTER — OUTPATIENT (OUTPATIENT)
Dept: OUTPATIENT SERVICES | Facility: HOSPITAL | Age: 51
LOS: 1 days | End: 2021-08-05
Payer: MEDICAID

## 2021-08-05 ENCOUNTER — APPOINTMENT (OUTPATIENT)
Dept: CT IMAGING | Facility: CLINIC | Age: 51
End: 2021-08-05

## 2021-08-05 ENCOUNTER — RESULT REVIEW (OUTPATIENT)
Age: 51
End: 2021-08-05

## 2021-08-05 ENCOUNTER — APPOINTMENT (OUTPATIENT)
Dept: ORTHOPEDIC SURGERY | Facility: CLINIC | Age: 51
End: 2021-08-05
Payer: MEDICAID

## 2021-08-05 VITALS
BODY MASS INDEX: 35.55 KG/M2 | HEART RATE: 84 BPM | TEMPERATURE: 96.7 F | SYSTOLIC BLOOD PRESSURE: 130 MMHG | OXYGEN SATURATION: 98 % | HEIGHT: 69 IN | DIASTOLIC BLOOD PRESSURE: 82 MMHG | WEIGHT: 240 LBS

## 2021-08-05 VITALS
HEART RATE: 80 BPM | DIASTOLIC BLOOD PRESSURE: 88 MMHG | BODY MASS INDEX: 35.55 KG/M2 | HEIGHT: 69 IN | WEIGHT: 240 LBS | SYSTOLIC BLOOD PRESSURE: 130 MMHG

## 2021-08-05 VITALS — SYSTOLIC BLOOD PRESSURE: 124 MMHG | DIASTOLIC BLOOD PRESSURE: 78 MMHG

## 2021-08-05 DIAGNOSIS — Z98.1 ARTHRODESIS STATUS: ICD-10-CM

## 2021-08-05 DIAGNOSIS — M19.041 PRIMARY OSTEOARTHRITIS, RIGHT HAND: ICD-10-CM

## 2021-08-05 DIAGNOSIS — M19.042 PRIMARY OSTEOARTHRITIS, LEFT HAND: ICD-10-CM

## 2021-08-05 PROCEDURE — 99214 OFFICE O/P EST MOD 30 MIN: CPT | Mod: 25

## 2021-08-05 PROCEDURE — 73130 X-RAY EXAM OF HAND: CPT | Mod: RT

## 2021-08-05 PROCEDURE — 99072 ADDL SUPL MATRL&STAF TM PHE: CPT

## 2021-08-05 PROCEDURE — 99214 OFFICE O/P EST MOD 30 MIN: CPT

## 2021-08-05 PROCEDURE — 20550 NJX 1 TENDON SHEATH/LIGAMENT: CPT | Mod: F7

## 2021-08-05 PROCEDURE — 72131 CT LUMBAR SPINE W/O DYE: CPT | Mod: 26

## 2021-08-05 PROCEDURE — 76376 3D RENDER W/INTRP POSTPROCES: CPT | Mod: 26

## 2021-08-05 RX ORDER — METHYLPREDNISOLONE ACETATE 40 MG/ML
40 INJECTION, SUSPENSION INTRA-ARTICULAR; INTRALESIONAL; INTRAMUSCULAR; SOFT TISSUE
Qty: 0.5 | Refills: 0 | Status: DISCONTINUED | COMMUNITY
Start: 2021-07-07 | End: 2021-08-05

## 2021-08-05 RX ORDER — KETOROLAC TROMETHAMINE 10 MG/1
10 TABLET, FILM COATED ORAL 3 TIMES DAILY
Qty: 15 | Refills: 0 | Status: DISCONTINUED | COMMUNITY
Start: 2021-08-05 | End: 2021-08-05

## 2021-08-05 NOTE — ASSESSMENT
[FreeTextEntry1] : T2DM: increase metformin to 1000 bid, recommend low carb diet, wt loss, exercise, f/u a1c\par Vitamin D def: f/u level\par Calcium ox in urine: f/u repeat\par RTC 2 wks

## 2021-08-05 NOTE — HISTORY OF PRESENT ILLNESS
[FreeTextEntry8] : 50 yo male presents for follow up of diabetes. reports compliance with metformin. Denies fever, chills, cp, palpitations, sob, nv, heat/cold intolerance, dizziness, melena, hematochezia, muscle weakness, loss of sensation, bowel/bladder incontinence or calf pain.\par

## 2021-08-11 DIAGNOSIS — R79.89 OTHER SPECIFIED ABNORMAL FINDINGS OF BLOOD CHEMISTRY: ICD-10-CM

## 2021-08-11 DIAGNOSIS — R82.998 OTHER ABNORMAL FINDINGS IN URINE: ICD-10-CM

## 2021-08-11 LAB
25(OH)D3 SERPL-MCNC: 34.8 NG/ML
APPEARANCE: CLEAR
BACTERIA: NEGATIVE
BILIRUBIN URINE: NEGATIVE
BLOOD URINE: NEGATIVE
COLOR: YELLOW
ESTIMATED AVERAGE GLUCOSE: 151 MG/DL
GLUCOSE QUALITATIVE U: NEGATIVE
HBA1C MFR BLD HPLC: 6.9 %
HYALINE CASTS: 0 /LPF
KETONES URINE: NEGATIVE
LEUKOCYTE ESTERASE URINE: NEGATIVE
MICROSCOPIC-UA: NORMAL
NITRITE URINE: NEGATIVE
PH URINE: 7
PROTEIN URINE: NORMAL
RED BLOOD CELLS URINE: 1 /HPF
SPECIFIC GRAVITY URINE: 1.03
SQUAMOUS EPITHELIAL CELLS: 0 /HPF
UROBILINOGEN URINE: NORMAL
WHITE BLOOD CELLS URINE: 0 /HPF

## 2021-08-17 ENCOUNTER — APPOINTMENT (OUTPATIENT)
Dept: FAMILY MEDICINE | Facility: CLINIC | Age: 51
End: 2021-08-17

## 2021-09-02 ENCOUNTER — RX RENEWAL (OUTPATIENT)
Age: 51
End: 2021-09-02

## 2021-09-10 ENCOUNTER — APPOINTMENT (OUTPATIENT)
Dept: ORTHOPEDIC SURGERY | Facility: CLINIC | Age: 51
End: 2021-09-10

## 2021-10-06 ENCOUNTER — APPOINTMENT (OUTPATIENT)
Dept: FAMILY MEDICINE | Facility: CLINIC | Age: 51
End: 2021-10-06
Payer: MEDICAID

## 2021-10-06 ENCOUNTER — RESULT REVIEW (OUTPATIENT)
Age: 51
End: 2021-10-06

## 2021-10-06 VITALS
WEIGHT: 246.6 LBS | SYSTOLIC BLOOD PRESSURE: 130 MMHG | HEIGHT: 69 IN | HEART RATE: 77 BPM | OXYGEN SATURATION: 98 % | BODY MASS INDEX: 36.53 KG/M2 | DIASTOLIC BLOOD PRESSURE: 86 MMHG | TEMPERATURE: 97.5 F

## 2021-10-06 VITALS — SYSTOLIC BLOOD PRESSURE: 124 MMHG | DIASTOLIC BLOOD PRESSURE: 84 MMHG

## 2021-10-06 DIAGNOSIS — E11.37X3 TYPE 2 DIABETES MELLITUS WITH DIABETIC MACULAR EDEMA, RESOLVED FOLLOWING TREATMENT, BILATERAL: ICD-10-CM

## 2021-10-06 DIAGNOSIS — Z12.11 ENCOUNTER FOR SCREENING FOR MALIGNANT NEOPLASM OF COLON: ICD-10-CM

## 2021-10-06 PROCEDURE — 99214 OFFICE O/P EST MOD 30 MIN: CPT

## 2021-10-06 PROCEDURE — 99072 ADDL SUPL MATRL&STAF TM PHE: CPT

## 2021-10-06 RX ORDER — MELOXICAM 15 MG/1
15 TABLET ORAL DAILY
Qty: 14 | Refills: 0 | Status: DISCONTINUED | COMMUNITY
Start: 2021-07-07 | End: 2021-10-06

## 2021-10-06 NOTE — HISTORY OF PRESENT ILLNESS
[FreeTextEntry1] : Follow up. Pt states he needs a colorectal exam.  [de-identified] : 52 yo male presents for script for colon cancer screening. Reports pain in left big toe. Pain 8/10 in severity, worse with use, ongoing for 6 years. Denies fever, chills, cp, palpitations, sob, nv, heat/cold intolerance, dizziness, melena, hematochezia, muscle weakness, loss of sensation, bowel/bladder incontinence or calf pain.\par

## 2021-10-06 NOTE — ASSESSMENT
[FreeTextEntry1] : T2DM: recommend low carb diet wt loss, exercise, c/w metformin 1000 bid, f/u a1c/microalbumin, refer to opthalmology\par Chronic GERD: c/w ppi, refer to GI for egd\par Hepatic steatosis: recommend low fat diet, wt loss, exercise, f/u LFTs\par Screen for Colon CA: refer to GI for colonoscopy\par Lesion of adrenal gland: enlarged left adrenal gland on CT, f/u labwork, f/u MRI\par Left mtp pain: arthritis vs gout, start prednisone, f/u xray, f/u orthopedist\par RTC 3 wks

## 2021-10-06 NOTE — PHYSICAL EXAM
[Normal] : affect was normal and insight and judgment were intact [de-identified] : left mtp no swelling/warmth/deformities

## 2021-10-07 ENCOUNTER — APPOINTMENT (OUTPATIENT)
Dept: ORTHOPEDIC SURGERY | Facility: CLINIC | Age: 51
End: 2021-10-07

## 2021-10-13 ENCOUNTER — APPOINTMENT (OUTPATIENT)
Dept: ORTHOPEDIC SURGERY | Facility: CLINIC | Age: 51
End: 2021-10-13
Payer: MEDICAID

## 2021-10-13 VITALS
SYSTOLIC BLOOD PRESSURE: 137 MMHG | DIASTOLIC BLOOD PRESSURE: 88 MMHG | WEIGHT: 246 LBS | BODY MASS INDEX: 36.43 KG/M2 | HEART RATE: 84 BPM | HEIGHT: 69 IN

## 2021-10-13 DIAGNOSIS — M79.641 PAIN IN RIGHT HAND: ICD-10-CM

## 2021-10-13 DIAGNOSIS — M79.642 PAIN IN RIGHT HAND: ICD-10-CM

## 2021-10-13 PROCEDURE — 20551 NJX 1 TENDON ORIGIN/INSJ: CPT | Mod: F5

## 2021-10-13 PROCEDURE — 99214 OFFICE O/P EST MOD 30 MIN: CPT | Mod: 25

## 2021-10-13 PROCEDURE — 99072 ADDL SUPL MATRL&STAF TM PHE: CPT

## 2021-10-14 DIAGNOSIS — R80.9 PROTEINURIA, UNSPECIFIED: ICD-10-CM

## 2021-10-14 DIAGNOSIS — R74.8 ABNORMAL LEVELS OF OTHER SERUM ENZYMES: ICD-10-CM

## 2021-10-14 LAB
ACTH-ESO: <5 PG/ML
ALBUMIN SERPL ELPH-MCNC: 4.5 G/DL
ALP BLD-CCNC: 146 U/L
ALT SERPL-CCNC: 37 U/L
AST SERPL-CCNC: 22 U/L
BILIRUB DIRECT SERPL-MCNC: 0.1 MG/DL
BILIRUB INDIRECT SERPL-MCNC: 0.1 MG/DL
BILIRUB SERPL-MCNC: <0.2 MG/DL
CORTIS SERPL-MCNC: 4.1 UG/DL
CREAT SPEC-SCNC: 161 MG/DL
ESTIMATED AVERAGE GLUCOSE: 169 MG/DL
HBA1C MFR BLD HPLC: 7.5 %
METANEPHRINE, PL: 27.1 PG/ML
MICROALBUMIN 24H UR DL<=1MG/L-MCNC: 6.4 MG/DL
MICROALBUMIN/CREAT 24H UR-RTO: 40 MG/G
NORMETANEPHRINE, PL: 113.6 PG/ML
PROT SERPL-MCNC: 7.3 G/DL
URATE SERPL-MCNC: 3.9 MG/DL

## 2021-10-18 ENCOUNTER — APPOINTMENT (OUTPATIENT)
Dept: RADIOLOGY | Facility: CLINIC | Age: 51
End: 2021-10-18
Payer: MEDICAID

## 2021-10-18 ENCOUNTER — OUTPATIENT (OUTPATIENT)
Dept: OUTPATIENT SERVICES | Facility: HOSPITAL | Age: 51
LOS: 1 days | End: 2021-10-18
Payer: COMMERCIAL

## 2021-10-18 DIAGNOSIS — M19.079 PRIMARY OSTEOARTHRITIS, UNSPECIFIED ANKLE AND FOOT: ICD-10-CM

## 2021-10-18 PROCEDURE — 73630 X-RAY EXAM OF FOOT: CPT | Mod: 26,LT

## 2021-10-18 PROCEDURE — 73562 X-RAY EXAM OF KNEE 3: CPT | Mod: 26,50

## 2021-10-18 PROCEDURE — 73630 X-RAY EXAM OF FOOT: CPT

## 2021-10-18 PROCEDURE — 73562 X-RAY EXAM OF KNEE 3: CPT

## 2021-10-21 ENCOUNTER — APPOINTMENT (OUTPATIENT)
Dept: ORTHOPEDIC SURGERY | Facility: CLINIC | Age: 51
End: 2021-10-21
Payer: MEDICAID

## 2021-10-21 VITALS
WEIGHT: 246 LBS | HEART RATE: 84 BPM | HEIGHT: 69 IN | DIASTOLIC BLOOD PRESSURE: 94 MMHG | SYSTOLIC BLOOD PRESSURE: 140 MMHG | BODY MASS INDEX: 36.43 KG/M2

## 2021-10-21 DIAGNOSIS — Z86.39 PERSONAL HISTORY OF OTHER ENDOCRINE, NUTRITIONAL AND METABOLIC DISEASE: ICD-10-CM

## 2021-10-21 DIAGNOSIS — Z98.1 ARTHRODESIS STATUS: ICD-10-CM

## 2021-10-21 DIAGNOSIS — G89.29 OTHER CHRONIC PAIN: ICD-10-CM

## 2021-10-21 PROCEDURE — 99213 OFFICE O/P EST LOW 20 MIN: CPT

## 2021-10-21 PROCEDURE — 99072 ADDL SUPL MATRL&STAF TM PHE: CPT

## 2021-10-21 NOTE — HISTORY OF PRESENT ILLNESS
[de-identified] : 51 year old M Presents for follow up evaluation S/p lumbar fusion. He complains of persistent and worsening lower back pain, it appears to be a dysesthetic response in the BL thighs that feels like a warmness and itching. Muscle relaxants relieve pain for a short period. [Ataxia] : no ataxia [Incontinence] : no incontinence [Loss of Dexterity] : good dexterity [Urinary Ret.] : no urinary retention

## 2021-10-21 NOTE — ADDENDUM
[FreeTextEntry1] : Documented by Fernie Salgado acting as a scribe for Dr. Jassi Khalil on 10/21/2021. All medical record entries made by the Scribe were at my, Dr. Jassi Khalil, direction and personally dictated by me on 10/21/2021 . I have reviewed the chart and agree that the record accurately reflects my personal performance of the history, physical exam, assessment and plan. I have also personally directed, reviewed, and agreed with the chart.

## 2021-10-21 NOTE — PHYSICAL EXAM
[Obese] : obese [Poor Appearance] : well-appearing [Acute Distress] : not in acute distress [de-identified] : CONSTITUTIONAL: The patient is a very pleasant individual who is well-nourished and who appears stated age.\par PSYCHIATRIC: The patient is alert and oriented X 3 and in no apparent distress, and participates with orthopedic evaluation well.\par HEAD: Atraumatic and is nonsyndromic in appearance.\par EENT: No visible thyromegaly, EOMI.\par RESPIRATORY: Respiratory rate is regular, not dyspneic on examination.\par LYMPHATICS: There is no inguinal lymphadenopathy\par INTEGUMENTARY: Skin is clean, dry, and intact about the bilateral lower extremities and lumbar spine.\par VASCULAR: There is brisk capillary refill about the bilateral lower extremities.\par NEUROLOGIC: There are no pathologic reflexes.  Deep tendon reflexes are well maintained at 2+/4 of the bilateral lower extremities and are symmetric. Complaints of BL thigh radiculopathy \par MUSCULOSKELETAL: There is no visible muscular atrophy. Manual motor strength is well maintained in the bilateral lower extremities. The patient ambulates in a non-myelopathic manner. Negative tension sign and straight leg raise bilaterally. Quad extension, ankle dorsiflexion, EHL, plantar flexion, and ankle eversion are well preserved. Normal secondary orthopaedic exam of bilateral hips, greater trochanteric area, knees and ankles. Mechanically oriented lower back pain and lumbar myositis.  [de-identified] : Xray of the lumbar spine taken 07/22/2021 demonstrates S/p L5-S1 lumbar fusion. \par \par Xray of the lumbar spine taken 06/17/2021 demonstrates S/p L5-S1 fusion, no acute changes. \par \par Xray of the lumbar spine taken 12/22/2020 demonstrates S/p L5-S1 instrumented fusion. appears to be improvement of the spondylolisthesis, instrumentation appears well positioned. \par \par Xray of the lumbar spine taken 3//2020 demonstrates S/p L5-S1 instrumented fusion. appears to be improvement of the spondylolisthesis, instrumentation appears well positioned. \par \par pre op x rays reviewed with improved alignment post operatively

## 2021-10-21 NOTE — DISCUSSION/SUMMARY
[de-identified] : We talked about the nature of the condition and treatment options. Anticipatory guidance regarding dysesthesia was giving. since this office functions only in a surgical capacity and therefore we are unable to comment, either verbally or in written form, on the working capability or disability status of this patient. If the patient wishes for a functional evaluation they may be referred to [facility] for a functional evaluation. Patient has been referred to pain management for assessment regarding pain control. Patient to follow up in  as planned. \par \par Prior to appointment and during encounter with patient extensive medical records were reviewed including but not limited to, hospital records, out patient records, imaging results, and lab data. During this appointment the patient was examined, diagnoses were discussed and explained in a face to face manner. In addition extensive time was spent reviewing aforementioned diagnostic studies. Counseling including abnormal image results, differential diagnoses, treatment options, risk and benefits, lifestyle changes, current condition, and current medications was performed. Patient's comments, questions, and concerns were address and patient verbalized understanding. Based on this patient's presentation at our office, which is an orthopedic spine surgeon's office, this patient inherently / intrinsically has a risk, however minute, of developing  issues such as Cauda equina syndrome, bowel and bladder changes, or progression of motor or neurological deficits such as paralysis which may be permanent.

## 2021-10-25 ENCOUNTER — NON-APPOINTMENT (OUTPATIENT)
Age: 51
End: 2021-10-25

## 2021-10-25 ENCOUNTER — APPOINTMENT (OUTPATIENT)
Dept: OPHTHALMOLOGY | Facility: CLINIC | Age: 51
End: 2021-10-25
Payer: MEDICAID

## 2021-10-25 PROCEDURE — 92004 COMPRE OPH EXAM NEW PT 1/>: CPT

## 2021-10-25 PROCEDURE — 99072 ADDL SUPL MATRL&STAF TM PHE: CPT

## 2021-10-27 ENCOUNTER — APPOINTMENT (OUTPATIENT)
Dept: ORTHOPEDIC SURGERY | Facility: CLINIC | Age: 51
End: 2021-10-27
Payer: MEDICAID

## 2021-10-27 VITALS
BODY MASS INDEX: 36.43 KG/M2 | HEART RATE: 85 BPM | SYSTOLIC BLOOD PRESSURE: 140 MMHG | DIASTOLIC BLOOD PRESSURE: 89 MMHG | WEIGHT: 246 LBS | HEIGHT: 69 IN

## 2021-10-27 DIAGNOSIS — M19.079 PRIMARY OSTEOARTHRITIS, UNSPECIFIED ANKLE AND FOOT: ICD-10-CM

## 2021-10-27 LAB — DHEA-SULFATE, SERUM: 30 UG/DL

## 2021-10-27 PROCEDURE — 99214 OFFICE O/P EST MOD 30 MIN: CPT

## 2021-10-27 PROCEDURE — 99072 ADDL SUPL MATRL&STAF TM PHE: CPT

## 2021-10-29 ENCOUNTER — APPOINTMENT (OUTPATIENT)
Dept: FAMILY MEDICINE | Facility: CLINIC | Age: 51
End: 2021-10-29
Payer: MEDICAID

## 2021-10-29 VITALS
BODY MASS INDEX: 36.43 KG/M2 | HEIGHT: 69 IN | OXYGEN SATURATION: 98 % | WEIGHT: 246 LBS | DIASTOLIC BLOOD PRESSURE: 86 MMHG | TEMPERATURE: 97.5 F | HEART RATE: 82 BPM | SYSTOLIC BLOOD PRESSURE: 134 MMHG

## 2021-10-29 VITALS — SYSTOLIC BLOOD PRESSURE: 126 MMHG | DIASTOLIC BLOOD PRESSURE: 82 MMHG

## 2021-10-29 DIAGNOSIS — F41.8 OTHER SPECIFIED ANXIETY DISORDERS: ICD-10-CM

## 2021-10-29 DIAGNOSIS — E27.49 OTHER ADRENOCORTICAL INSUFFICIENCY: ICD-10-CM

## 2021-10-29 PROCEDURE — 99214 OFFICE O/P EST MOD 30 MIN: CPT

## 2021-10-29 PROCEDURE — 99072 ADDL SUPL MATRL&STAF TM PHE: CPT

## 2021-10-29 RX ORDER — DEXAMETHASONE 1 MG/1
1 TABLET ORAL DAILY
Qty: 1 | Refills: 0 | Status: DISCONTINUED | COMMUNITY
Start: 2021-10-06 | End: 2021-10-29

## 2021-10-29 RX ORDER — PREDNISONE 20 MG/1
20 TABLET ORAL DAILY
Qty: 5 | Refills: 0 | Status: DISCONTINUED | COMMUNITY
Start: 2021-10-06 | End: 2021-10-29

## 2021-10-29 NOTE — ASSESSMENT
[FreeTextEntry1] : Situational anxiety: anxiety in specific circumstance of mri, take alprazolam .25 mg single tab 20 min prior to mri\par Lesion of adrenal gland: pt reports feeling well, f/u mri, left adrenal enlargement seen on CT lumbar, decreased cortisol am, will repeat with additional labwork, referred to endocrinology\par RTC 3 wks

## 2021-10-29 NOTE — HISTORY OF PRESENT ILLNESS
[FreeTextEntry1] : Follow up discuss meds [de-identified] : 52 yo male presents with complaint of anxiety during mri. He says when he attempted to have an mri performed recently but he says he felt intense anxiety, he felt like he couldn't stay and had to end the mri. He is requesting medication so that he can have it done. Denies fever, chills, cp, palpitations, sob, nv, heat/cold intolerance, dizziness, melena, hematochezia, muscle weakness, loss of sensation, bowel/bladder incontinence or calf pain.\par

## 2021-11-01 ENCOUNTER — LABORATORY RESULT (OUTPATIENT)
Age: 51
End: 2021-11-01

## 2021-11-02 ENCOUNTER — LABORATORY RESULT (OUTPATIENT)
Age: 51
End: 2021-11-02

## 2021-11-08 ENCOUNTER — APPOINTMENT (OUTPATIENT)
Dept: ORTHOPEDIC SURGERY | Facility: CLINIC | Age: 51
End: 2021-11-08
Payer: MEDICAID

## 2021-11-08 DIAGNOSIS — M25.561 PAIN IN RIGHT KNEE: ICD-10-CM

## 2021-11-08 DIAGNOSIS — M25.562 PAIN IN RIGHT KNEE: ICD-10-CM

## 2021-11-08 PROCEDURE — 99072 ADDL SUPL MATRL&STAF TM PHE: CPT

## 2021-11-08 PROCEDURE — 20610 DRAIN/INJ JOINT/BURSA W/O US: CPT | Mod: LT

## 2021-11-08 PROCEDURE — 99214 OFFICE O/P EST MOD 30 MIN: CPT | Mod: 25

## 2021-11-08 NOTE — HISTORY OF PRESENT ILLNESS
[FreeTextEntry1] : Patient is a 51-year-old male who comes in today presenting with acute on chronic bilateral knee pain.  Patient reports that the left knee is more bothersome today than the right knee.  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  He reports that he has been dealing with this pain for years foot states that it is gotten worse recently.  The patient is currently working as a  and reports that the long hours standing on his feet exacerbate the pain in both knees.  He states that today the left knee is more painful than the right; he rates the left knee as a 6 out of 10 and the right knee as a 4 out of 10 on the pain scale today.  The patient has attempted over-the-counter creams as well as some conservative therapies but has not had any sustained relief.  Patient denies any numbness and tingling in bilateral lower extremities.

## 2021-11-08 NOTE — PROCEDURE
[FreeTextEntry1] : Laterality: Left knee\par \par The risks and benefits of the injection were reviewed with the patient today in office and all their questions were answered.  The injection site was the anterolateral aspect of the knee with the patient sitting up, knees flexed to 90 degrees.  Prior to giving the injection the injection site was prepped with Chloraprep and a sterile field was created.  Sterile technique was carried out throughout the procedure.  After verbal consent from the patient we went ahead and injected the left knee today with 2 ml 40 mg Depo-Medrol, 4 ml 1% lidocaine and 4 ml of .50% Bupivacaine for a total of 10 ml with a 22 berry 1.5" needle.  The medication was placed into the knee without complication.  Post injection the patient reported no pain, had a normal gait and good motion of the knee.  The patient denied numbness and tingling going down their leg.  There were no complications during the procedure.

## 2021-11-08 NOTE — DISCUSSION/SUMMARY
[de-identified] : Assessment: Bilateral knee Osteoarthritis/Pain\par \par Plan:\par 1. RICE Therapy.\par 2. Antiinflammatories/Tylenol as needed for pain of discomfort. \par 3. The patient was advised to rest the knee for 24-48 hours post injection.  The patient is able to resume normal activities in 24-48 hours post injection if the patient is experiencing minimal to no pain. \par 4. Continue with a home exercise/stretching program. \par 5. All the patients questions were answered.  If the patient is experiencing any problems or has concerns they were advised to call the office or make an appointment to come in to be evaluated.\par \par *Cortisone injection was given for the left knee.  If he continues to have pain in the right knee, he could return to the office next week for a right knee cortisone injection.\par

## 2021-11-08 NOTE — PHYSICAL EXAM
[de-identified] : Bilateral knee Physical Examination:\par \par General: Alert and oriented x3.  In no acute distress.  Pleasant in nature with a normal affect.  No apparent respiratory distress. \par \par Erythema, Warmth, Rubor: Negative\par Swelling/Edema: Minimal\par ROM: 0-120 degrees\par Mikayla's Test: Positive\par Medial Joint Line TTP: Positive\par Lateral Joint Line TTP: Positive\par Lachman Exam/Anterior Drawer/Pivot Shift Test: Negative \par MCL Pain: Negative\par LCL Pain: Negative\par Iliotibial Band Pain: Negative\par Patellofemoral Joint Pain: Negative\par Patellar Tendon TTP: Negative\par Pes Anserinus TTP: Negative\par Homans Sign: Negative\par Posterior Knee Pain: Negative\par Neuro: Intact with no sensory or motor deficits [de-identified] : EXAM: XR KNEE AP LAT OBL 3 VIEWS BI\par \par \par PROCEDURE DATE: 10/18/2021\par \par \par \par INTERPRETATION: CLINICAL INDICATION: chronic bilateral knee pain\par \par EXAM:\par Frontal, oblique, and lateral views of both knees from 10/18/2021 at 1023. No similar prior studies available for comparison.\par \par IMPRESSION:\par Chronic ossicles in bilateral anterior tibial tuberosity regions, left more conspicuous than right. Otherwise, no dislocations, acute appearing fractures, or joint effusions.\par \par Degenerative spurring along peripheral left medial tibiofemoral articular margins. Questionable narrowed left lateral patellofemoral joint space. Preserved remaining joint spaces and no joint margin erosions.\par \par Small bilateral superior patellar enthesophytes.\par \par No destructive osseous lesions or periosteal reaction.\par \par --- End of Report ---\par \par \par \par \par \par \par CODY BURT MD; Attending Radiologist\par This document has been electronically signed. Oct 18 2021 12:13PM\par

## 2021-11-16 ENCOUNTER — APPOINTMENT (OUTPATIENT)
Dept: FAMILY MEDICINE | Facility: CLINIC | Age: 51
End: 2021-11-16
Payer: MEDICAID

## 2021-11-16 VITALS
BODY MASS INDEX: 35.99 KG/M2 | OXYGEN SATURATION: 98 % | HEART RATE: 88 BPM | DIASTOLIC BLOOD PRESSURE: 82 MMHG | WEIGHT: 243 LBS | HEIGHT: 69 IN | SYSTOLIC BLOOD PRESSURE: 130 MMHG | TEMPERATURE: 97.6 F

## 2021-11-16 VITALS — SYSTOLIC BLOOD PRESSURE: 124 MMHG | DIASTOLIC BLOOD PRESSURE: 82 MMHG

## 2021-11-16 DIAGNOSIS — K76.0 FATTY (CHANGE OF) LIVER, NOT ELSEWHERE CLASSIFIED: ICD-10-CM

## 2021-11-16 DIAGNOSIS — E27.9 DISORDER OF ADRENAL GLAND, UNSPECIFIED: ICD-10-CM

## 2021-11-16 DIAGNOSIS — R79.89 OTHER SPECIFIED ABNORMAL FINDINGS OF BLOOD CHEMISTRY: ICD-10-CM

## 2021-11-16 PROCEDURE — 99214 OFFICE O/P EST MOD 30 MIN: CPT

## 2021-11-16 PROCEDURE — 99072 ADDL SUPL MATRL&STAF TM PHE: CPT

## 2021-11-16 RX ORDER — ACETAMINOPHEN 500 MG/1
500 TABLET ORAL
Qty: 180 | Refills: 0 | Status: DISCONTINUED | COMMUNITY
Start: 2021-10-06 | End: 2021-11-16

## 2021-11-16 NOTE — HISTORY OF PRESENT ILLNESS
[FreeTextEntry1] : follow up mri  [de-identified] : 52 yo male presents for follow up of mri abd. no acute complaints. reports feeling well. Denies fever, chills, cp, palpitations, sob, nv, heat/cold intolerance, dizziness, melena, hematochezia, muscle weakness, loss of sensation, bowel/bladder incontinence or calf pain.\par

## 2021-11-16 NOTE — ASSESSMENT
[FreeTextEntry1] : Abnormal MRI abdomen: marked steatosis, possible fibrosis/scarring, no stigmata of liver disease, no wt loss, will evaluate liver, refer to GI\par Hepatic steatosis: avoid alcohol/tylenol, recommend low fat diet, wt loss, exercise\par Lesion of adrenal gland: nodular thickening likely benign adenomatous changes, referred to endocrinology\par Multiple pancreatic cysts: referred to GI for evaluation, has appt 11/29/21\par Low testosterone: f/u repeat level\par RTC 2 wks

## 2021-11-17 ENCOUNTER — APPOINTMENT (OUTPATIENT)
Dept: ORTHOPEDIC SURGERY | Facility: CLINIC | Age: 51
End: 2021-11-17
Payer: MEDICAID

## 2021-11-19 ENCOUNTER — LABORATORY RESULT (OUTPATIENT)
Age: 51
End: 2021-11-19

## 2021-11-19 ENCOUNTER — OUTPATIENT (OUTPATIENT)
Dept: OUTPATIENT SERVICES | Facility: HOSPITAL | Age: 51
LOS: 1 days | End: 2021-11-19
Payer: COMMERCIAL

## 2021-11-19 VITALS
WEIGHT: 243.39 LBS | OXYGEN SATURATION: 98 % | TEMPERATURE: 97 F | HEIGHT: 69 IN | RESPIRATION RATE: 20 BRPM | DIASTOLIC BLOOD PRESSURE: 83 MMHG | HEART RATE: 76 BPM | SYSTOLIC BLOOD PRESSURE: 127 MMHG

## 2021-11-19 DIAGNOSIS — M65.331 TRIGGER FINGER, RIGHT MIDDLE FINGER: ICD-10-CM

## 2021-11-19 DIAGNOSIS — Z29.9 ENCOUNTER FOR PROPHYLACTIC MEASURES, UNSPECIFIED: ICD-10-CM

## 2021-11-19 DIAGNOSIS — M65.311 TRIGGER THUMB, RIGHT THUMB: ICD-10-CM

## 2021-11-19 DIAGNOSIS — Z01.818 ENCOUNTER FOR OTHER PREPROCEDURAL EXAMINATION: ICD-10-CM

## 2021-11-19 DIAGNOSIS — E11.9 TYPE 2 DIABETES MELLITUS WITHOUT COMPLICATIONS: ICD-10-CM

## 2021-11-19 DIAGNOSIS — Z98.1 ARTHRODESIS STATUS: Chronic | ICD-10-CM

## 2021-11-19 LAB
A1C WITH ESTIMATED AVERAGE GLUCOSE RESULT: 7.3 % — HIGH (ref 4–5.6)
ANION GAP SERPL CALC-SCNC: 10 MMOL/L — SIGNIFICANT CHANGE UP (ref 5–17)
APTT BLD: 32.3 SEC — SIGNIFICANT CHANGE UP (ref 27.5–35.5)
BASOPHILS # BLD AUTO: 0.03 K/UL — SIGNIFICANT CHANGE UP (ref 0–0.2)
BASOPHILS NFR BLD AUTO: 0.4 % — SIGNIFICANT CHANGE UP (ref 0–2)
BUN SERPL-MCNC: 11.4 MG/DL — SIGNIFICANT CHANGE UP (ref 8–20)
CALCIUM SERPL-MCNC: 9.5 MG/DL — SIGNIFICANT CHANGE UP (ref 8.6–10.2)
CHLORIDE SERPL-SCNC: 103 MMOL/L — SIGNIFICANT CHANGE UP (ref 98–107)
CO2 SERPL-SCNC: 26 MMOL/L — SIGNIFICANT CHANGE UP (ref 22–29)
CREAT SERPL-MCNC: 0.8 MG/DL — SIGNIFICANT CHANGE UP (ref 0.5–1.3)
EOSINOPHIL # BLD AUTO: 0.18 K/UL — SIGNIFICANT CHANGE UP (ref 0–0.5)
EOSINOPHIL NFR BLD AUTO: 2.3 % — SIGNIFICANT CHANGE UP (ref 0–6)
ESTIMATED AVERAGE GLUCOSE: 163 MG/DL — HIGH (ref 68–114)
GLUCOSE SERPL-MCNC: 93 MG/DL — SIGNIFICANT CHANGE UP (ref 70–99)
HCT VFR BLD CALC: 42.3 % — SIGNIFICANT CHANGE UP (ref 39–50)
HGB BLD-MCNC: 14 G/DL — SIGNIFICANT CHANGE UP (ref 13–17)
IMM GRANULOCYTES NFR BLD AUTO: 0.1 % — SIGNIFICANT CHANGE UP (ref 0–1.5)
INR BLD: 0.93 RATIO — SIGNIFICANT CHANGE UP (ref 0.88–1.16)
LYMPHOCYTES # BLD AUTO: 2.23 K/UL — SIGNIFICANT CHANGE UP (ref 1–3.3)
LYMPHOCYTES # BLD AUTO: 28.8 % — SIGNIFICANT CHANGE UP (ref 13–44)
MCHC RBC-ENTMCNC: 31.4 PG — SIGNIFICANT CHANGE UP (ref 27–34)
MCHC RBC-ENTMCNC: 33.1 GM/DL — SIGNIFICANT CHANGE UP (ref 32–36)
MCV RBC AUTO: 94.8 FL — SIGNIFICANT CHANGE UP (ref 80–100)
MONOCYTES # BLD AUTO: 0.65 K/UL — SIGNIFICANT CHANGE UP (ref 0–0.9)
MONOCYTES NFR BLD AUTO: 8.4 % — SIGNIFICANT CHANGE UP (ref 2–14)
NEUTROPHILS # BLD AUTO: 4.64 K/UL — SIGNIFICANT CHANGE UP (ref 1.8–7.4)
NEUTROPHILS NFR BLD AUTO: 60 % — SIGNIFICANT CHANGE UP (ref 43–77)
PLATELET # BLD AUTO: 272 K/UL — SIGNIFICANT CHANGE UP (ref 150–400)
POTASSIUM SERPL-MCNC: 4.5 MMOL/L — SIGNIFICANT CHANGE UP (ref 3.5–5.3)
POTASSIUM SERPL-SCNC: 4.5 MMOL/L — SIGNIFICANT CHANGE UP (ref 3.5–5.3)
PROTHROM AB SERPL-ACNC: 10.8 SEC — SIGNIFICANT CHANGE UP (ref 10.6–13.6)
RBC # BLD: 4.46 M/UL — SIGNIFICANT CHANGE UP (ref 4.2–5.8)
RBC # FLD: 12.6 % — SIGNIFICANT CHANGE UP (ref 10.3–14.5)
SODIUM SERPL-SCNC: 138 MMOL/L — SIGNIFICANT CHANGE UP (ref 135–145)
WBC # BLD: 7.74 K/UL — SIGNIFICANT CHANGE UP (ref 3.8–10.5)
WBC # FLD AUTO: 7.74 K/UL — SIGNIFICANT CHANGE UP (ref 3.8–10.5)

## 2021-11-19 PROCEDURE — 36415 COLL VENOUS BLD VENIPUNCTURE: CPT

## 2021-11-19 PROCEDURE — G0463: CPT

## 2021-11-19 PROCEDURE — 85610 PROTHROMBIN TIME: CPT

## 2021-11-19 PROCEDURE — 83036 HEMOGLOBIN GLYCOSYLATED A1C: CPT

## 2021-11-19 PROCEDURE — 93005 ELECTROCARDIOGRAM TRACING: CPT

## 2021-11-19 PROCEDURE — 85730 THROMBOPLASTIN TIME PARTIAL: CPT

## 2021-11-19 PROCEDURE — 93010 ELECTROCARDIOGRAM REPORT: CPT

## 2021-11-19 PROCEDURE — 80048 BASIC METABOLIC PNL TOTAL CA: CPT

## 2021-11-19 PROCEDURE — 85025 COMPLETE CBC W/AUTO DIFF WBC: CPT

## 2021-11-19 RX ORDER — METHOCARBAMOL 500 MG/1
2 TABLET, FILM COATED ORAL
Qty: 0 | Refills: 0 | DISCHARGE

## 2021-11-19 NOTE — H&P PST ADULT - ATTENDING COMMENTS
I examined the patient in preop holding and reviewed the above H&P and lab results.  Patient had painful triggering of right thumb and middle fingers with only transient relief from nonoperative managements.  He was indicated for right trigger thumb release and right middle finger trigger finger release.  Risks, benefits, alternatives were discussed in detail at the office.  He had the opportunity to ask additional questions today and informed written consent was obtained from the patient.  Surgical side and sites were confirmed and marked.  Boarded to OR.

## 2021-11-19 NOTE — H&P PST ADULT - HISTORY OF PRESENT ILLNESS
pain mgt Dr. ocampo  51 year old male present to pst with c/o b/l hand pain and trigger finger : thumb and middle finger. Patient report suffer from OA affecting his spine, hands, knees and feet. He report trigger finger are no longer responding to injection, physical therapy and NSAIDs. He was refereed to pain management and awaiting consult. He also mention that his PCP noted abnormal MRI abdomen: marked hepatic steatosis, possible fibrosis/scarring and Multiple pancreatic cysts: referred to GI for evaluation, has appt 11/29/21  Lesion of adrenal gland: nodular thickening likely benign adenomatous changes, was also noted he was referred to endocrinology.  Patient state doing well reports feeling except for fatigue and reports weight gain and arthritic pain. He is schedule for right trigger, thumb release, right middle finger, trigger finger release on 12/9/2021 with Dr. Garcia.

## 2021-11-19 NOTE — H&P PST ADULT - NSICDXPASTMEDICALHX_GEN_ALL_CORE_FT
PAST MEDICAL HISTORY:  Arthritis     At risk for sleep apnea     DM (diabetes mellitus)     Fatty liver     GERD (gastroesophageal reflux disease)     H/O spinal stenosis     History of syncope episode 6 years ago, follow [facility]    HLD (hyperlipidemia)     Lesion of adrenal gland     Lumbar disc herniation     OA (osteoarthritis)     Pancreatic cyst     Renal stones     Sciatica     Spondylolisthesis, lumbosacral region      PAST MEDICAL HISTORY:  Arthritis     At risk for sleep apnea     DM (diabetes mellitus)     Fatty liver     GERD (gastroesophageal reflux disease)     H/O spinal stenosis     Hepatic steatosis     History of syncope episode 6 years ago, follow by [facility] cardiologist    HLD (hyperlipidemia)     Lesion of adrenal gland     Lumbar disc herniation     OA (osteoarthritis)     Pancreatic cyst     Renal stones     Sciatica     Spondylolisthesis, lumbosacral region

## 2021-11-19 NOTE — H&P PST ADULT - ASSESSMENT
CAPRINI SCORE [CLOT]    AGE RELATED RISK FACTORS                                                       MOBILITY RELATED FACTORS  [x ] Age 41-60 years                                            (1 Point)                  [ ] Bed rest                                                        (1 Point)  [ ] Age: 61-74 years                                           (2 Points)                 [ ] Plaster cast                                                   (2 Points)  [ ] Age= 75 years                                              (3 Points)                   [ ] Bed bound for more than 72 hours                 (2 Points)    DISEASE RELATED RISK FACTORS                                               GENDER SPECIFIC FACTORS  [ ] Edema in the lower extremities                       (1 Point)              [ ] Pregnancy                                                     (1 Point)  [ ] Varicose veins                                               (1 Point)                     [ ] Post-partum < 6 weeks                                   (1 Point)             [x ] BMI > 25 Kg/m2                                            (1 Point)                     [ ] Hormonal therapy  or oral contraception          (1 Point)                 [ ] Sepsis (in the previous month)                        (1 Point)                [ ] History of pregnancy complications                 (1 point)  [ ] Pneumonia or serious lung disease                                                [ ] Unexplained or recurrent                     (1 Point)           (in the previous month)                               (1 Point)  [ ] Abnormal pulmonary function test                     (1 Point)                 SURGERY RELATED RISK FACTORS  [ ] Acute myocardial infarction                              (1 Point)                   [ ]  Section                                             (1 Point)  [ ] Congestive heart failure (in the previous month)  (1 Point)           [ ] Minor surgery                                                  (1 Point)   [ ] Inflammatory bowel disease                             (1 Point)                   [ ] Arthroscopic surgery                                        (2 Points)  [ ] Central venous access                                      (2 Points)                    [ x] General surgery lasting more than 45 minutes   (2 Points)       [ ] Stroke (in the previous month)                          (5 Points)                 [ ] Elective arthroplasty                                         (5 Points)            [ ] Malignacy (past or present)                          (2 ponits)                                                                                                                            HEMATOLOGY RELATED FACTORS                                                 TRAUMA RELATED RISK FACTORS  [ ] Prior episodes of VTE                                     (3 Points)                [ ] Fracture of the hip, pelvis, or leg                       (5 Points)  [ ] Positive family history for VTE                         (3 Points)             [ ] Acute spinal cord injury (in the previous month)  (5 Points)  [ ] Prothrombin 76486 A                                     (3 Points)                [ ] Paralysis  (less than 1 month)                             (5 Points)  [ ] Factor V Leiden                                             (3 Points)                   [ ] Multiple Trauma within 1 month                        (5 Points)  [ ] Lupus anticoagulants                                     (3 Points)                                                           [ ] Anticardiolipin antibodies                               (3 Points)                                                       [ ] High homocysteine in the blood                      (3 Points)                                             [ ] Other congenital or acquired thrombophilia      (3 Points)                                                [ ] Heparin induced thrombocytopenia                  (3 Points)                                          Total Score [   4       ]    Caprini Score 0 - 2:  Low Risk, No VTE Prophylaxis required for most patients, encourage ambulation  Caprini Score 3 - 6:  At Risk, pharmacologic VTE prophylaxis is indicated for most patients (in the absence of a contraindication)  Caprini Score Greater than or = 7:  High Risk, pharmacologic VTE prophylaxis is indicated for most patients (in the absence of a contraindication)    51 year old male present to pst with c/o b/l hand pain and trigger finger : thumb and middle finger. Patient report suffer from OA affecting his spine, hands and knees and feet. He report trigger finger are no longer responding to injection, physical therapy and NSAIDs. He is schedule for right trigger, thumb release, right middle finger, trigger finger release on 2021 with Dr. Garcia.   Patient educated on written and verbal preop instructions.    Patient verbalized understanding after "teach back" method of instruction were given   Medications reviewed, instructions given on what medications to take and what not to take.  Pt instructed to stop Herbals or anti-inflammatory meds one week prior to surgery and discuss with PMD.

## 2021-11-19 NOTE — H&P PST ADULT - PROBLEM SELECTOR PLAN 3
right trigger, thumb release, right middle finger, trigger finger release on 12/9/2021 with Dr. Garcia

## 2021-11-19 NOTE — H&P PST ADULT - NSICDXFAMILYHX_GEN_ALL_CORE_FT
FAMILY HISTORY:  FH: CAD (coronary artery disease)  FH: HTN (hypertension)    Father  Still living? Yes, Estimated age: Age Unknown  FH: diabetes mellitus, Age at diagnosis: Age Unknown  FH: prostate cancer, Age at diagnosis: Age Unknown    Mother  Still living? Yes, Estimated age: Age Unknown  FH: diabetes mellitus, Age at diagnosis: Age Unknown  FH: glaucoma, Age at diagnosis: Age Unknown  FHx: heart disease, Age at diagnosis: Age Unknown

## 2021-11-22 ENCOUNTER — TRANSCRIPTION ENCOUNTER (OUTPATIENT)
Age: 51
End: 2021-11-22

## 2021-11-23 ENCOUNTER — TRANSCRIPTION ENCOUNTER (OUTPATIENT)
Age: 51
End: 2021-11-23

## 2021-11-26 PROBLEM — K76.0 FATTY (CHANGE OF) LIVER, NOT ELSEWHERE CLASSIFIED: Chronic | Status: ACTIVE | Noted: 2021-11-19

## 2021-11-26 PROBLEM — N20.0 CALCULUS OF KIDNEY: Chronic | Status: ACTIVE | Noted: 2021-11-19

## 2021-11-26 PROBLEM — E27.9 DISORDER OF ADRENAL GLAND, UNSPECIFIED: Chronic | Status: ACTIVE | Noted: 2021-11-19

## 2021-11-26 PROBLEM — M19.90 UNSPECIFIED OSTEOARTHRITIS, UNSPECIFIED SITE: Chronic | Status: ACTIVE | Noted: 2021-11-19

## 2021-11-26 PROBLEM — K86.2 CYST OF PANCREAS: Chronic | Status: ACTIVE | Noted: 2021-11-19

## 2021-11-26 PROBLEM — Z87.39 PERSONAL HISTORY OF OTHER DISEASES OF THE MUSCULOSKELETAL SYSTEM AND CONNECTIVE TISSUE: Chronic | Status: ACTIVE | Noted: 2021-11-19

## 2021-12-01 ENCOUNTER — APPOINTMENT (OUTPATIENT)
Dept: FAMILY MEDICINE | Facility: CLINIC | Age: 51
End: 2021-12-01
Payer: MEDICAID

## 2021-12-01 ENCOUNTER — NON-APPOINTMENT (OUTPATIENT)
Age: 51
End: 2021-12-01

## 2021-12-01 ENCOUNTER — APPOINTMENT (OUTPATIENT)
Dept: ORTHOPEDIC SURGERY | Facility: CLINIC | Age: 51
End: 2021-12-01
Payer: MEDICAID

## 2021-12-01 VITALS
TEMPERATURE: 96.9 F | HEIGHT: 69 IN | OXYGEN SATURATION: 97 % | DIASTOLIC BLOOD PRESSURE: 80 MMHG | SYSTOLIC BLOOD PRESSURE: 124 MMHG | BODY MASS INDEX: 36.29 KG/M2 | WEIGHT: 245 LBS | HEART RATE: 86 BPM

## 2021-12-01 DIAGNOSIS — R93.5 ABNORMAL FINDINGS ON DIAGNOSTIC IMAGING OF OTHER ABDOMINAL REGIONS, INCLUDING RETROPERITONEUM: ICD-10-CM

## 2021-12-01 DIAGNOSIS — Z01.818 ENCOUNTER FOR OTHER PREPROCEDURAL EXAMINATION: ICD-10-CM

## 2021-12-01 DIAGNOSIS — M79.671 PAIN IN RIGHT FOOT: ICD-10-CM

## 2021-12-01 DIAGNOSIS — M79.672 PAIN IN RIGHT FOOT: ICD-10-CM

## 2021-12-01 DIAGNOSIS — M17.0 BILATERAL PRIMARY OSTEOARTHRITIS OF KNEE: ICD-10-CM

## 2021-12-01 DIAGNOSIS — E29.1 TESTICULAR HYPOFUNCTION: ICD-10-CM

## 2021-12-01 PROCEDURE — 99072 ADDL SUPL MATRL&STAF TM PHE: CPT

## 2021-12-01 PROCEDURE — 73630 X-RAY EXAM OF FOOT: CPT | Mod: 50

## 2021-12-01 PROCEDURE — 99213 OFFICE O/P EST LOW 20 MIN: CPT

## 2021-12-01 PROCEDURE — 99214 OFFICE O/P EST MOD 30 MIN: CPT | Mod: 25

## 2021-12-01 PROCEDURE — 93000 ELECTROCARDIOGRAM COMPLETE: CPT

## 2021-12-01 RX ORDER — CEFAZOLIN SODIUM 1 G
2000 VIAL (EA) INJECTION ONCE
Refills: 0 | Status: DISCONTINUED | OUTPATIENT
Start: 2021-12-09 | End: 2021-12-24

## 2021-12-01 RX ORDER — ALPRAZOLAM 0.25 MG/1
0.25 TABLET ORAL DAILY
Qty: 3 | Refills: 0 | Status: DISCONTINUED | COMMUNITY
Start: 2021-10-29 | End: 2021-12-01

## 2021-12-01 RX ORDER — METHYLPREDNISOLONE ACETATE 40 MG/ML
40 INJECTION, SUSPENSION INTRA-ARTICULAR; INTRALESIONAL; INTRAMUSCULAR; SOFT TISSUE
Qty: 0.5 | Refills: 0 | Status: DISCONTINUED | COMMUNITY
Start: 2021-10-13 | End: 2021-12-01

## 2021-12-01 NOTE — DISCUSSION/SUMMARY
[de-identified] : Today I had a lengthy discussion with the patient regarding their bilateral great toe pain. I have addressed all the patient's concerns surrounding the pathology of their condition. XR imaging was completed in office today and results were reviewed with the patient. At this time I would like to obtain advanced imaging of the patient's bilateral feet to evaluate the 1st MTP joint. An MRI was ordered so I can find out more about the etiology of the patient's condition. The patient should follow up with the office after obtaining the MRI. The patient understood and verbally agreed to the treatment plan. All of their questions were answered and they were satisfied with the visit. The patient should call the office if they have any questions or experience worsening symptoms.

## 2021-12-01 NOTE — HISTORY OF PRESENT ILLNESS
[FreeTextEntry1] : 12/1/2021: The patient is a 51 year old male who presents for a evaluation of acute bilateral great toe pain. Pain is 6 out 10, with a burning sensation. Denies any acute trauma or injury to his feet. The patient denies any medical history of Gout. He was last evaluated by the office on 11/08/2021, where he was given cortisone injections to the left knee. The patient reports that his left is doing well overall with minimal pain post left knee cortisone injection. Further, he reports improved pain in the right knee. He does confirm a MHx of DM Type 2. The patient reports prior back surgery with hardware implantation. He has upcoming hand surgery with Dr. Villegas for trigger finger. No other complaints.  \par \par 11/08/2021: Patient is a 51-year-old male who comes in today presenting with acute on chronic bilateral knee pain.  Patient reports that the left knee is more bothersome today than the right knee.  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  He reports that he has been dealing with this pain for years foot states that it is gotten worse recently.  The patient is currently working as a  and reports that the long hours standing on his feet exacerbate the pain in both knees.  He states that today the left knee is more painful than the right; he rates the left knee as a 6 out of 10 and the right knee as a 4 out of 10 on the pain scale today.  The patient has attempted over-the-counter creams as well as some conservative therapies but has not had any sustained relief.  Patient denies any numbness and tingling in bilateral lower extremities.

## 2021-12-01 NOTE — ADDENDUM
[FreeTextEntry1] : I, Marlyn Dias, acted solely as a scribe for Dr. Norman Muller on this date 12/01/2021.\par \par All medical record entries made by the Scribe were at my, Dr. Norman Muller, direction and personally dictated by me on 12/01/2021 . I have reviewed the chart and agree that the record accurately reflects my personal performance of the history, physical exam, assessment and plan. I have also personally directed, reviewed, and agreed with the chart.	\par

## 2021-12-01 NOTE — PHYSICAL EXAM
[de-identified] : General: Alert and oriented x3. In no acute distress. Pleasant in nature with a normal affect. No apparent respiratory distress.\par \par Bilateral Foot Exam\par Skin: Clean, dry, intact\par Inspection: No obvious malalignment, no masses, no swelling, no effusion\par Pulses: 2+ DP/PT pulses\par ROM: FOOT Full  ROM of digits, ANKLE 10 degrees of dorsiflexion, 40 degrees of plantarflexion, 10 degrees of subtalar motion.\par Painful ROM: None\par Tenderness: No tenderness over the medial malleolus, No tenderness over the lateral malleolus, no CFL/ATFL/PTFL pain, no deltoid ligament pain. No heel pain. No Achilles tenderness. No 5th metatarsal pain. No pain to the LisFranc joint. No ttp over the posterior tibial tendon.\par Stability: Negative anterior/posterior drawer.\par Strength: 5/5 ADD/ABD/TA/GS/EHL/FHL/EDL\par Neuro: Sensation in tact to light touch throughout\par Additional tests: Negative Mortons test, negative tarsal tunnel tinels, negative single heel rise.	\par \par L Big Toe Exam\par ROM Great toe: 40 degrees of dorsiflexion, 20 degrees of plantarflexion.				 [de-identified] : XRs of the bilateral feet were ordered, obtained, and reviewed by me today, 12/01/2021, revealed: Hallux Rigidus bilaterally.\par

## 2021-12-06 LAB
ALBUMIN SERPL ELPH-MCNC: 4.5 G/DL
ALP BLD-CCNC: 92 U/L
ALT SERPL-CCNC: 36 U/L
ANION GAP SERPL CALC-SCNC: 14 MMOL/L
AST SERPL-CCNC: 21 U/L
BASOPHILS # BLD AUTO: 0.04 K/UL
BASOPHILS NFR BLD AUTO: 0.5 %
BILIRUB SERPL-MCNC: 0.2 MG/DL
BUN SERPL-MCNC: 14 MG/DL
CALCIUM SERPL-MCNC: 9.6 MG/DL
CHLORIDE SERPL-SCNC: 104 MMOL/L
CO2 SERPL-SCNC: 22 MMOL/L
CREAT SERPL-MCNC: 0.89 MG/DL
EOSINOPHIL # BLD AUTO: 0.16 K/UL
EOSINOPHIL NFR BLD AUTO: 2.2 %
GLUCOSE SERPL-MCNC: 136 MG/DL
HCT VFR BLD CALC: 41.7 %
HGB BLD-MCNC: 13.8 G/DL
IMM GRANULOCYTES NFR BLD AUTO: 0.5 %
LYMPHOCYTES # BLD AUTO: 2.06 K/UL
LYMPHOCYTES NFR BLD AUTO: 28.1 %
MAN DIFF?: NORMAL
MCHC RBC-ENTMCNC: 31.8 PG
MCHC RBC-ENTMCNC: 33.1 GM/DL
MCV RBC AUTO: 96.1 FL
MONOCYTES # BLD AUTO: 0.71 K/UL
MONOCYTES NFR BLD AUTO: 9.7 %
NEUTROPHILS # BLD AUTO: 4.32 K/UL
NEUTROPHILS NFR BLD AUTO: 59 %
PLATELET # BLD AUTO: 247 K/UL
POTASSIUM SERPL-SCNC: 4.1 MMOL/L
PROT SERPL-MCNC: 7 G/DL
RBC # BLD: 4.34 M/UL
RBC # FLD: 13.2 %
SODIUM SERPL-SCNC: 140 MMOL/L
WBC # FLD AUTO: 7.33 K/UL

## 2021-12-06 NOTE — PHYSICAL EXAM
[Normal] : affect was normal and insight and judgment were intact [de-identified] : right middle finger/thumb ROM limited 2/2 pain, no deformities, nontender, sensation intact

## 2021-12-06 NOTE — PLAN
[FreeTextEntry1] : Pre-op exam: labwork pending\par Abnormal MRI abd: possible scarring, anti-smooth muscle AB weakly positive, will repeat, was referred to GI for evaluation\par Low testosterone: mild, secondary, LH/FSH wnl, prolactin wnl, cortisol am mildly low, was referred to endocrinology\par Hep B non-immune: will consider vaccine at f/u\par RTC 4 wks

## 2021-12-06 NOTE — ASSESSMENT
[High Risk Surgery - Intraperitoneal, Intrathoracic or Supringuinal Vascular Procedures] : High Risk Surgery - Intraperitoneal, Intrathoracic or Supringuinal Vascular Procedures - No (0) [Ischemic Heart Disease] : Ischemic Heart Disease - No (0) [Congestive Heart Failure] : Congestive Heart Failure - No (0) [Prior Cerebrovascular Accident or TIA] : Prior Cerebrovascular Accident or TIA - No (0) [Creatinine >= 2mg/dL (1 Point)] : Creatinine >= 2mg/dL - No (0) [Insulin-dependent Diabetic (1 Point)] : Insulin-dependent Diabetic - No (0) [0] : 0 , RCRI Class: I, Risk of Post-Op Cardiac Complications: 3.9%, 95% CI for Risk Estimate: 2.8% - 5.4% [Patient Optimized for Surgery] : Patient optimized for surgery [FreeTextEntry4] : 52 yo male presents for pre-op examination for right trigger thumb release and middle finger trigger finger release with Dr. Eaton on 12/9/21 medically optimized for procedure.

## 2021-12-06 NOTE — HISTORY OF PRESENT ILLNESS
[No Pertinent Cardiac History] : no history of aortic stenosis, atrial fibrillation, coronary artery disease, recent myocardial infarction, or implantable device/pacemaker [No Pertinent Pulmonary History] : no history of asthma, COPD, sleep apnea, or smoking [No Adverse Anesthesia Reaction] : no adverse anesthesia reaction in self or family member [Diabetes] : diabetes [(Patient denies any chest pain, claudication, dyspnea on exertion, orthopnea, palpitations or syncope)] : Patient denies any chest pain, claudication, dyspnea on exertion, orthopnea, palpitations or syncope [Moderate (4-6 METs)] : Moderate (4-6 METs) [Chronic Anticoagulation] : no chronic anticoagulation [Chronic Kidney Disease] : no chronic kidney disease [FreeTextEntry1] : Trigger Finger right hand [FreeTextEntry2] : 12/09/21 [FreeTextEntry3] : Dr. Eaton [FreeTextEntry4] : 52 yo male presents for pre-op examination for right trigger thumb release and middle finger trigger finger release with Dr. Eaton on 12/9/21. No acute complaints. Denies fever, chills, cp, palpitations, sob, nv, heat/cold intolerance, dizziness, melena, hematochezia, muscle weakness, loss of sensation, bowel/bladder incontinence or calf pain.\par  [FreeTextEntry7] : EKG shows sinus rhythm, no ST changes

## 2021-12-07 ENCOUNTER — APPOINTMENT (OUTPATIENT)
Dept: ENDOCRINOLOGY | Facility: CLINIC | Age: 51
End: 2021-12-07

## 2021-12-08 ENCOUNTER — TRANSCRIPTION ENCOUNTER (OUTPATIENT)
Age: 51
End: 2021-12-08

## 2021-12-09 ENCOUNTER — APPOINTMENT (OUTPATIENT)
Dept: ORTHOPEDIC SURGERY | Facility: HOSPITAL | Age: 51
End: 2021-12-09

## 2021-12-09 ENCOUNTER — APPOINTMENT (OUTPATIENT)
Dept: ENDOCRINOLOGY | Facility: CLINIC | Age: 51
End: 2021-12-09

## 2021-12-09 ENCOUNTER — OUTPATIENT (OUTPATIENT)
Dept: INPATIENT UNIT | Facility: HOSPITAL | Age: 51
LOS: 1 days | End: 2021-12-09
Payer: COMMERCIAL

## 2021-12-09 VITALS
DIASTOLIC BLOOD PRESSURE: 87 MMHG | TEMPERATURE: 97 F | OXYGEN SATURATION: 97 % | HEART RATE: 74 BPM | RESPIRATION RATE: 16 BRPM | SYSTOLIC BLOOD PRESSURE: 122 MMHG

## 2021-12-09 VITALS
OXYGEN SATURATION: 99 % | HEIGHT: 69 IN | TEMPERATURE: 98 F | HEART RATE: 76 BPM | RESPIRATION RATE: 14 BRPM | DIASTOLIC BLOOD PRESSURE: 87 MMHG | SYSTOLIC BLOOD PRESSURE: 136 MMHG | WEIGHT: 243.39 LBS

## 2021-12-09 DIAGNOSIS — Z98.1 ARTHRODESIS STATUS: Chronic | ICD-10-CM

## 2021-12-09 DIAGNOSIS — M65.331 TRIGGER FINGER, RIGHT MIDDLE FINGER: ICD-10-CM

## 2021-12-09 DIAGNOSIS — M65.311 TRIGGER THUMB, RIGHT THUMB: ICD-10-CM

## 2021-12-09 LAB — GLUCOSE BLDC GLUCOMTR-MCNC: 96 MG/DL — SIGNIFICANT CHANGE UP (ref 70–99)

## 2021-12-09 PROCEDURE — 82962 GLUCOSE BLOOD TEST: CPT

## 2021-12-09 PROCEDURE — 26055 INCISE FINGER TENDON SHEATH: CPT | Mod: F5

## 2021-12-09 RX ORDER — ACETAMINOPHEN 500 MG
975 TABLET ORAL ONCE
Refills: 0 | Status: COMPLETED | OUTPATIENT
Start: 2021-12-09 | End: 2021-12-09

## 2021-12-09 RX ORDER — METFORMIN HYDROCHLORIDE 850 MG/1
1 TABLET ORAL
Qty: 0 | Refills: 0 | DISCHARGE

## 2021-12-09 RX ORDER — OMEPRAZOLE 10 MG/1
1 CAPSULE, DELAYED RELEASE ORAL
Qty: 0 | Refills: 0 | DISCHARGE

## 2021-12-09 RX ORDER — ONDANSETRON 8 MG/1
4 TABLET, FILM COATED ORAL ONCE
Refills: 0 | Status: DISCONTINUED | OUTPATIENT
Start: 2021-12-09 | End: 2021-12-09

## 2021-12-09 RX ORDER — SODIUM CHLORIDE 9 MG/ML
3 INJECTION INTRAMUSCULAR; INTRAVENOUS; SUBCUTANEOUS ONCE
Refills: 0 | Status: DISCONTINUED | OUTPATIENT
Start: 2021-12-09 | End: 2021-12-09

## 2021-12-09 RX ORDER — CEFAZOLIN SODIUM 1 G
2000 VIAL (EA) INJECTION ONCE
Refills: 0 | Status: DISCONTINUED | OUTPATIENT
Start: 2021-12-09 | End: 2021-12-09

## 2021-12-09 RX ORDER — SODIUM CHLORIDE 9 MG/ML
1000 INJECTION, SOLUTION INTRAVENOUS
Refills: 0 | Status: DISCONTINUED | OUTPATIENT
Start: 2021-12-09 | End: 2021-12-09

## 2021-12-09 RX ORDER — CELECOXIB 200 MG/1
400 CAPSULE ORAL ONCE
Refills: 0 | Status: COMPLETED | OUTPATIENT
Start: 2021-12-09 | End: 2021-12-09

## 2021-12-09 RX ORDER — FENTANYL CITRATE 50 UG/ML
50 INJECTION INTRAVENOUS
Refills: 0 | Status: DISCONTINUED | OUTPATIENT
Start: 2021-12-09 | End: 2021-12-09

## 2021-12-09 RX ADMIN — Medication 975 MILLIGRAM(S): at 17:22

## 2021-12-09 RX ADMIN — CELECOXIB 400 MILLIGRAM(S): 200 CAPSULE ORAL at 17:23

## 2021-12-09 NOTE — ASU DISCHARGE PLAN (ADULT/PEDIATRIC) - NS MD DC FALL RISK RISK
For information on Fall & Injury Prevention, visit: [email or web address] OR  [email or web address] OR  https://www.cdc.gov/steadi/patient.html

## 2021-12-09 NOTE — BRIEF OPERATIVE NOTE - OPERATION/FINDINGS
thickened A1 pulley of right thumb and middle fingers with synovitis along flexor tendons.  A1 pulley of each digit released under direct visualization

## 2021-12-09 NOTE — BRIEF OPERATIVE NOTE - NSICDXBRIEFPROCEDURE_GEN_ALL_CORE_FT
PROCEDURES:  Release of trigger thumb 09-Dec-2021 19:07:19 right Nancie Garcia  Trigger finger release 09-Dec-2021 19:07:30 right middle finger Nancie Garcia

## 2021-12-09 NOTE — ASU DISCHARGE PLAN (ADULT/PEDIATRIC) - ASU DC SPECIAL INSTRUCTIONSFT
- No more than 2 pounds to right hand   - Pain control as needed   - May remove dressings after 5 days  - Keep dressings dry   - Follow up in office with Dr. Garcia in 2 weeks

## 2021-12-09 NOTE — BRIEF OPERATIVE NOTE - NSICDXBRIEFPOSTOP_GEN_ALL_CORE_FT
POST-OP DIAGNOSIS:  Trigger thumb of right hand 09-Dec-2021 19:09:24  Nancie Garcia  Trigger middle finger of right hand 09-Dec-2021 19:09:32  Nancie Garcia

## 2021-12-09 NOTE — BRIEF OPERATIVE NOTE - NSICDXBRIEFPREOP_GEN_ALL_CORE_FT
PRE-OP DIAGNOSIS:  Trigger thumb of right hand 09-Dec-2021 19:08:53  Nancie Garcia  Trigger middle finger of right hand 09-Dec-2021 19:09:10  Nancie Garcia

## 2021-12-09 NOTE — ASU PREOP CHECKLIST - STERILIZATION AFFIRMATION
Per Patient there were no changes to med, dosage, sig or quantity.   The medication(s) set up as pending and waiting for your approval.  Preferred Pharmacy has been set up and verified        
Unable to refill per protocol.  RITALIN, last filled  09/14/19  
n/a

## 2021-12-09 NOTE — ASU DISCHARGE PLAN (ADULT/PEDIATRIC) - CARE PROVIDER_API CALL
Nancie Garcia)  Orthopedics  [address] [place] [address], [address]  [place] [address]  Phone: [phone]  Fax: [phone]  Follow Up Time:

## 2021-12-10 ENCOUNTER — OUTPATIENT (OUTPATIENT)
Dept: OUTPATIENT SERVICES | Facility: HOSPITAL | Age: 51
LOS: 1 days | End: 2021-12-10

## 2021-12-10 ENCOUNTER — RESULT REVIEW (OUTPATIENT)
Age: 51
End: 2021-12-10

## 2021-12-10 ENCOUNTER — APPOINTMENT (OUTPATIENT)
Dept: MRI IMAGING | Facility: CLINIC | Age: 51
End: 2021-12-10
Payer: MEDICAID

## 2021-12-10 DIAGNOSIS — M79.671 PAIN IN RIGHT FOOT: ICD-10-CM

## 2021-12-10 DIAGNOSIS — Z98.1 ARTHRODESIS STATUS: Chronic | ICD-10-CM

## 2021-12-10 PROCEDURE — 73718 MRI LOWER EXTREMITY W/O DYE: CPT | Mod: 26,RT,77

## 2021-12-10 PROCEDURE — 73718 MRI LOWER EXTREMITY W/O DYE: CPT | Mod: 26,LT

## 2021-12-13 ENCOUNTER — TRANSCRIPTION ENCOUNTER (OUTPATIENT)
Age: 51
End: 2021-12-13

## 2021-12-13 ENCOUNTER — RX RENEWAL (OUTPATIENT)
Age: 51
End: 2021-12-13

## 2021-12-16 ENCOUNTER — APPOINTMENT (OUTPATIENT)
Dept: ORTHOPEDIC SURGERY | Facility: CLINIC | Age: 51
End: 2021-12-16

## 2021-12-17 ENCOUNTER — APPOINTMENT (OUTPATIENT)
Dept: ORTHOPEDIC SURGERY | Facility: CLINIC | Age: 51
End: 2021-12-17

## 2021-12-17 ENCOUNTER — TRANSCRIPTION ENCOUNTER (OUTPATIENT)
Age: 51
End: 2021-12-17

## 2021-12-17 RX ORDER — ERGOCALCIFEROL 1.25 MG/1
1.25 MG CAPSULE, LIQUID FILLED ORAL
Qty: 4 | Refills: 0 | Status: ACTIVE | COMMUNITY
Start: 2021-03-18 | End: 1900-01-01

## 2021-12-20 ENCOUNTER — TRANSCRIPTION ENCOUNTER (OUTPATIENT)
Age: 51
End: 2021-12-20

## 2021-12-22 ENCOUNTER — APPOINTMENT (OUTPATIENT)
Dept: ORTHOPEDIC SURGERY | Facility: CLINIC | Age: 51
End: 2021-12-22
Payer: MEDICAID

## 2021-12-22 DIAGNOSIS — M65.331 TRIGGER FINGER, RIGHT MIDDLE FINGER: ICD-10-CM

## 2021-12-22 DIAGNOSIS — M65.311 TRIGGER THUMB, RIGHT THUMB: ICD-10-CM

## 2021-12-22 PROCEDURE — 99024 POSTOP FOLLOW-UP VISIT: CPT

## 2021-12-22 NOTE — HISTORY OF PRESENT ILLNESS
[Doing Well] : is doing well [de-identified] : s/p right trigger thumb release. Right middle finger trigger finger release. DOS 12/9/21 [de-identified] : 12/22/2021\par Mr. SULAIMAN AVALOS returns for follow up about 2 weeks status post the above procedures.  Reports minimal pain at incision.  Denies fevers or chills.  Denies paresthesia.  No new complaints.  [de-identified] : GENERAL: Awake, alert, cooperative, answers questions appropriately. No acute distress.  Ambulates independently with a normal gait.\par SKIN: Warm, dry, intact. Color and turgor normal. \par LUNGS: Demonstrates unlabored breathing on room air with no accessory muscle use.\par EXTREMITIES: Warm and well-perfused. \par NEUROLOGICAL: Grossly intact.\par \par FOCUSED UPPER EXTREMITY EXAM:\par \par RUE:\par -Mild appropriate post op swelling in thumb and middle finger\par -Incisions well healed with no erythema or drainage.  No signs of infection.\par -Sutures were removed without difficulty.\par -Able to flex and extend all digits with mild stiffness.\par -Sensation intact in all digits.\par -All digits warm and well perfused.  [de-identified] : 51 year old male at 2 weeks status post the above procedures, doing well.\par \par -We discussed in detail the clinical findings\par -I reassured patient that he is healing well\par -I demonstrated to him scar massages and encourage him to move his fingers as much as he can tolerate\par -I referred him to OT for ROM, edema control, pain control, scar massage, and modalities\par -I will see him as needed\par -He had the opportunity to ask questions and was in agreement with the plan.

## 2021-12-27 ENCOUNTER — APPOINTMENT (OUTPATIENT)
Dept: ORTHOPEDIC SURGERY | Facility: CLINIC | Age: 51
End: 2021-12-27

## 2021-12-30 DIAGNOSIS — M25.50 PAIN IN UNSPECIFIED JOINT: ICD-10-CM

## 2022-01-03 ENCOUNTER — TRANSCRIPTION ENCOUNTER (OUTPATIENT)
Age: 52
End: 2022-01-03

## 2022-01-03 DIAGNOSIS — F41.9 ANXIETY DISORDER, UNSPECIFIED: ICD-10-CM

## 2022-01-07 ENCOUNTER — APPOINTMENT (OUTPATIENT)
Dept: ORTHOPEDIC SURGERY | Facility: CLINIC | Age: 52
End: 2022-01-07

## 2022-01-11 ENCOUNTER — RX RENEWAL (OUTPATIENT)
Age: 52
End: 2022-01-11

## 2022-01-20 ENCOUNTER — APPOINTMENT (OUTPATIENT)
Dept: ENDOCRINOLOGY | Facility: CLINIC | Age: 52
End: 2022-01-20

## 2022-01-24 ENCOUNTER — TRANSCRIPTION ENCOUNTER (OUTPATIENT)
Age: 52
End: 2022-01-24

## 2022-01-24 ENCOUNTER — APPOINTMENT (OUTPATIENT)
Dept: RHEUMATOLOGY | Facility: CLINIC | Age: 52
End: 2022-01-24

## 2022-01-25 ENCOUNTER — RX RENEWAL (OUTPATIENT)
Age: 52
End: 2022-01-25

## 2022-01-27 ENCOUNTER — APPOINTMENT (OUTPATIENT)
Dept: ORTHOPEDIC SURGERY | Facility: CLINIC | Age: 52
End: 2022-01-27
Payer: MEDICAID

## 2022-01-27 DIAGNOSIS — M20.22 HALLUX RIGIDUS, RIGHT FOOT: ICD-10-CM

## 2022-01-27 DIAGNOSIS — M79.674 PAIN IN RIGHT TOE(S): ICD-10-CM

## 2022-01-27 DIAGNOSIS — M20.21 HALLUX RIGIDUS, RIGHT FOOT: ICD-10-CM

## 2022-01-27 DIAGNOSIS — M79.675 PAIN IN LEFT TOE(S): ICD-10-CM

## 2022-01-27 PROCEDURE — 99213 OFFICE O/P EST LOW 20 MIN: CPT | Mod: 24

## 2022-01-27 PROCEDURE — 99072 ADDL SUPL MATRL&STAF TM PHE: CPT

## 2022-01-27 NOTE — DISCUSSION/SUMMARY
[de-identified] : At this point in time I reviewed bilateral feet MRIs with the patient. I explained to him that he has severe first metatarsal phalangeal joint osteoarthritis and bilateral feet. I prescribed the patient enteric-coated Naprosyn 500 mg to take twice a day for the foot pain. I explained to him that proper supportive shoe wear is a must, harder/rigid sole would be better for him. I do want him to return to office in 2 weeks to meet with Dr. Muller for a preoperative appointment for hallux rigidus left foot first. All of his questions were answered and he understood and agreed to the treatment plan above.

## 2022-01-27 NOTE — PHYSICAL EXAM
[de-identified] : Bilateral foot Physical Examination:\par \par General: Alert and oriented x3.  In no acute distress.  Pleasant in nature with a normal affect.  No apparent respiratory distress. \par Erythema, Warmth, Rubor: Negative\par Swelling: Negative\par \par ROM Ankle:\par 1. Dorsiflexion: 10 degrees\par 2. Plantarflexion: 40 degrees\par 3. Inversion: 20 degrees\par 4. Eversion: 10 degrees\par \par ROM of digits: Normal\par \par Pes Planus: Negative\par Pes Cavus: Negative\par \par Bunion: Negative\par Zahra's Bunion (Bunionette): Negative\par Hammer Toe Deformity/Deformities: Negative\par \par Tenderness to Palpation: \par 1. Heel Pain: Negative\par 2. Midfoot Pain: Negative\par 3. First MTP Joint: Severe pain at the first MTPJ bilateral feet, left worse than right.\par 4. Lis Franc Joint: Negative\par \par Tenderness Metatarsals:\par 1st MT: Negative\par 2nd MT: Negative\par 3rd MT: Negative\par 4th MT: Negative\par 5th MT: Negative\par Base of the 5th MT: Negative\par \par Ligament Pain:\par 1. Lis Franc Ligament: Negative\par 2. Plantar Fascia Ligament: Negative\par \par Strength: \par 5/5 TA/GS/EHL/FHL/EDL/ADD/ABD\par \par Pulses: 2+ DP/PT Pulses\par \par Capillary Refill Toes: <2 seconds\par \par Neuro: Intact motor and sensory throughout\par \par Additional Test:\par 1. Rodrigues's Squeeze Test: Negative\par 2. Calcaneal Squeeze Test: Negative [de-identified] : \par EXAM: MR FOOT RT\par \par \par PROCEDURE DATE: 12/10/2021\par \par \par \par INTERPRETATION: RIGHT FOOT MRI\par \par CLINICAL INFORMATION: First metatarsophalangeal joint pain.\par TECHNIQUE: Multiplanar, multisequence MRI was obtained of the right foot.\par COMPARISON: Bilateral foot radiographs dated 12/1/2021 retrieved from [facility].\par \par FINDINGS:\par \par LIGAMENTS AND CAPSULAR STRUCTURES: The plantar plates of the lesser toes are intact. The Lisfranc ligament is intact.\par MUSCLES AND TENDONS: The tendons are preserved. There is no muscular edema, atrophy, or fatty replacement.\par CARTILAGE AND SUBCHONDRAL BONE: Severe first metatarsophalangeal hallux sesamoid complex osteoarthritis with extensive marrow edema and subchondral cystic change. There is chronic appearing fragmentation of the dorsal osteophyte along the base of the first proximal phalanx. There is irregularity of the subchondral bone plate along the lateral aspect of the first metatarsal head, which may be degenerative or be related to a minimally depressed subchondral fracture.\par OSSEOUS ALIGNMENT: Normal.\par BONE MARROW: Subchondral edema and cystic change at the first metatarsophalangeal joint. Remaining disc marrow signal is otherwise unremarkable.\par WEB SPACES: Small second webspace interdigital neuroma, measuring 4 x 3 mm. Mild third webspace intermetatarsal bursitis.\par PERIPHERAL SOFT TISSUES: There is subcutaneous soft tissue edema.\par \par IMPRESSION:\par \par Severe first metatarsophalangeal hallux sesamoid complex osteoarthritis. Subchondral edema with subchondral irregularity along the lateral aspect of the first metatarsal head, which may be related to degenerative changes or be related to a minimally depressed subchondral fracture.\par \par Small second webspace interdigital neuroma. Mild third webspace intermetatarsal bursitis.\par \par --- End of Report ---\par \par \par \par \par \par KIMMIE GARCIA M.D., RADIOLOGY FELLOW\par This document has been electronically signed.\par ROSEMARY MCCLURE M.D., ATTENDING RADIOLOGIST\par This document has been electronically signed. Dec 15 2021 11:04AM\par \par EXAM: MR FOOT LT\par \par \par PROCEDURE DATE: 12/10/2021\par \par \par \par INTERPRETATION: LEFT FOOT MRI\par \par CLINICAL INFORMATION: First metatarsophalangeal joint pain.\par TECHNIQUE: Multiplanar, multisequence MRI was obtained of the left foot.\par COMPARISON: Left foot radiograph dated 10/13/2021.\par \par FINDINGS:\par \par LIGAMENTS AND CAPSULAR STRUCTURES: The plantar plates are intact. The Lisfranc ligament is intact.\par MUSCLES AND TENDONS: The tendons are preserved. There is no muscular edema, atrophy, or fatty replacement.\par CARTILAGE AND SUBCHONDRAL BONE: Advanced arthrosis of the first metatarsophalangeal joint with subchondral edema on both sides of the joint and a 7 mm shallow subchondral insufficiency fracture of the metatarsal head.\par OSSEOUS ALIGNMENT: Normal.\par BONE MARROW: Subchondral insufficiency fracture in the first metatarsal head, as described above.\par WEB SPACES: Minimal perineural fibrosis in the second intermetatarsal web space. Small fluid in the third intermetatarsal bursa.\par PERIPHERAL SOFT TISSUES: Scattered soft tissue edema.\par \par IMPRESSION:\par Advanced arthrosis of the first metatarsophalangeal joint with a shallow subchondral insufficiency fracture of the metatarsal head.\par \par --- End of Report ---\par \par \par \par \par \par KIMMIE GARCIA M.D., RADIOLOGY FELLOW\par This document has been electronically signed.\par YAIR SAHNI MD; Attending Radiologist\par This document has been electronically signed. Dec 14 2021 4:25PM\par \par \par \par \par XRs of the bilateral feet were ordered, obtained, and reviewed, 12/01/2021, revealed: Hallux Rigidus bilaterally.\par

## 2022-01-27 NOTE — HISTORY OF PRESENT ILLNESS
[FreeTextEntry1] : 1/27/22: The patient is here to discuss bilateral foot MRIs. The patient has severe pain in bilateral first metatarsal phalangeal joints, left worse than right. He is here to discuss possible surgery and how to fix this problem that has been bothering him for a very long time. He presents using a cane and wearing [employer] sneakers today. He states that he has pain on a daily basis. Pain scale in the left foot 8 out of 10. Pain scale in the right foot 6 out of 10. Patient is a diabetic. Patient has no neuropathy in his feet.\par \par 12/1/2021: The patient is a 51 year old male who presents for a evaluation of acute bilateral great toe pain. Pain is 6 out 10, with a burning sensation. Denies any acute trauma or injury to his feet. The patient denies any medical history of Gout. He was last evaluated by the office on 11/08/2021, where he was given cortisone injections to the left knee. The patient reports that his left is doing well overall with minimal pain post left knee cortisone injection. Further, he reports improved pain in the right knee. He does confirm a MHx of DM Type 2. The patient reports prior back surgery with hardware implantation. He has upcoming hand surgery with Dr. Villegas for trigger finger. No other complaints.  \par \par 11/08/2021: Patient is a 51-year-old male who comes in today presenting with acute on chronic bilateral knee pain.  Patient reports that the left knee is more bothersome today than the right knee.  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  He reports that he has been dealing with this pain for years foot states that it is gotten worse recently.  The patient is currently working as a  and reports that the long hours standing on his feet exacerbate the pain in both knees.  He states that today the left knee is more painful than the right; he rates the left knee as a 6 out of 10 and the right knee as a 4 out of 10 on the pain scale today.  The patient has attempted over-the-counter creams as well as some conservative therapies but has not had any sustained relief.  Patient denies any numbness and tingling in bilateral lower extremities.

## 2022-01-30 ENCOUNTER — TRANSCRIPTION ENCOUNTER (OUTPATIENT)
Age: 52
End: 2022-01-30

## 2022-02-10 ENCOUNTER — APPOINTMENT (OUTPATIENT)
Dept: ORTHOPEDIC SURGERY | Facility: CLINIC | Age: 52
End: 2022-02-10

## 2022-02-17 ENCOUNTER — RX RENEWAL (OUTPATIENT)
Age: 52
End: 2022-02-17

## 2022-02-22 ENCOUNTER — RX RENEWAL (OUTPATIENT)
Age: 52
End: 2022-02-22

## 2022-03-01 NOTE — ED PROVIDER NOTE - PRINCIPAL DIAGNOSIS
24 y/o male  received 12:35 pm, with complaint of elevated blood pressure, initial chest discomfort this morning, anxious feeling . No complaint of present pain voiced at this time. Vitals presently stable.  Xanax administered as ordered for anxiety. Pending lab results for further dispo. Chest pain

## 2022-03-04 ENCOUNTER — APPOINTMENT (OUTPATIENT)
Dept: ENDOCRINOLOGY | Facility: CLINIC | Age: 52
End: 2022-03-04

## 2022-03-17 ENCOUNTER — APPOINTMENT (OUTPATIENT)
Dept: FAMILY MEDICINE | Facility: CLINIC | Age: 52
End: 2022-03-17
Payer: MEDICAID

## 2022-03-17 VITALS
DIASTOLIC BLOOD PRESSURE: 80 MMHG | HEART RATE: 82 BPM | WEIGHT: 246 LBS | SYSTOLIC BLOOD PRESSURE: 126 MMHG | OXYGEN SATURATION: 98 % | TEMPERATURE: 97.7 F | HEIGHT: 69 IN | BODY MASS INDEX: 36.43 KG/M2

## 2022-03-17 DIAGNOSIS — K86.2 CYST OF PANCREAS: ICD-10-CM

## 2022-03-17 DIAGNOSIS — M89.49 OTHER HYPERTROPHIC OSTEOARTHROPATHY, MULTIPLE SITES: ICD-10-CM

## 2022-03-17 DIAGNOSIS — D36.13 BENIGN NEOPLASM OF PERIPHERAL NERVES AND AUTONOMIC NERVOUS SYSTEM OF LOWER LIMB, INCLUDING HIP: ICD-10-CM

## 2022-03-17 DIAGNOSIS — M47.818 SPONDYLOSIS W/OUT MYELOPATHY OR RADICULOPATHY, SACRAL AND SACROCOCCYGEAL REGION: ICD-10-CM

## 2022-03-17 DIAGNOSIS — E11.9 TYPE 2 DIABETES MELLITUS W/OUT COMPLICATIONS: ICD-10-CM

## 2022-03-17 DIAGNOSIS — M43.16 SPONDYLOLISTHESIS, LUMBAR REGION: ICD-10-CM

## 2022-03-17 DIAGNOSIS — Z78.9 OTHER SPECIFIED HEALTH STATUS: ICD-10-CM

## 2022-03-17 DIAGNOSIS — K21.9 GASTRO-ESOPHAGEAL REFLUX DISEASE W/OUT ESOPHAGITIS: ICD-10-CM

## 2022-03-17 PROCEDURE — 99072 ADDL SUPL MATRL&STAF TM PHE: CPT

## 2022-03-17 PROCEDURE — 99214 OFFICE O/P EST MOD 30 MIN: CPT | Mod: 25

## 2022-03-17 RX ORDER — ALPRAZOLAM 0.25 MG/1
0.25 TABLET ORAL
Qty: 15 | Refills: 0 | Status: DISCONTINUED | COMMUNITY
Start: 2022-01-30 | End: 2022-03-17

## 2022-03-17 RX ORDER — METHYLPREDNISOLONE 4 MG/1
4 TABLET ORAL
Qty: 1 | Refills: 0 | Status: DISCONTINUED | COMMUNITY
Start: 2021-12-20 | End: 2022-03-17

## 2022-03-17 RX ORDER — CYCLOBENZAPRINE HYDROCHLORIDE 5 MG/1
5 TABLET, FILM COATED ORAL
Qty: 10 | Refills: 0 | Status: DISCONTINUED | COMMUNITY
Start: 2021-11-16 | End: 2022-03-17

## 2022-03-17 RX ORDER — VENLAFAXINE 37.5 MG/1
37.5 TABLET ORAL DAILY
Qty: 90 | Refills: 0 | Status: DISCONTINUED | COMMUNITY
Start: 2022-01-03 | End: 2022-03-17

## 2022-03-17 NOTE — HISTORY OF PRESENT ILLNESS
[FreeTextEntry1] : follow up [de-identified] : 52 yo male presents for follow up. He is requesting parking disability paperwork. no acute complaints. Denies fever, chills, cp, palpitations, sob, nv, heat/cold intolerance, dizziness, melena, hematochezia, muscle weakness, loss of sensation, bowel/bladder incontinence or calf pain.\par

## 2022-03-17 NOTE — ASSESSMENT
[FreeTextEntry1] : T2DM: recommend low carb diet, wt loss, exercise, f/u a1c, c/w metformin 1000 bid, start lisinopril 2.5 mg po daily\par Chronic GERD: c/w ppi prn for reflux, avoid late night meals/snacks (2 - 3 hours before bedtime), avoid smoking, avoid tight clothing especially around stomach area, you can consider raising the head of your bed by 6 to 8 inches, avoid foods that worsen symptoms which can include coffee, chocolate, alcohol, peppermint, and fatty foods, f/u GI\par Pancreatic cyst: seen on MRI abd, recommend evaluation with GI, referral placed, information given\par Hep B non-immune: give vaccine, recheck titers in 4 wks\par RTC 4 wks

## 2022-03-22 ENCOUNTER — APPOINTMENT (OUTPATIENT)
Dept: ORTHOPEDIC SURGERY | Facility: CLINIC | Age: 52
End: 2022-03-22

## 2022-03-24 LAB
ESTIMATED AVERAGE GLUCOSE: 143 MG/DL
HBA1C MFR BLD HPLC: 6.6 %

## 2022-03-30 ENCOUNTER — RX RENEWAL (OUTPATIENT)
Age: 52
End: 2022-03-30

## 2022-03-30 ENCOUNTER — TRANSCRIPTION ENCOUNTER (OUTPATIENT)
Age: 52
End: 2022-03-30

## 2022-04-04 ENCOUNTER — RX RENEWAL (OUTPATIENT)
Age: 52
End: 2022-04-04

## 2022-04-11 PROBLEM — Z11.59 SCREENING FOR VIRAL DISEASE: Status: ACTIVE | Noted: 2021-03-11

## 2022-04-12 ENCOUNTER — TRANSCRIPTION ENCOUNTER (OUTPATIENT)
Age: 52
End: 2022-04-12

## 2022-04-12 RX ORDER — DICLOFENAC SODIUM 1% 10 MG/G
1 GEL TOPICAL
Qty: 1 | Refills: 2 | Status: ACTIVE | COMMUNITY
Start: 2021-08-05 | End: 1900-01-01

## 2022-04-19 RX ORDER — OMEPRAZOLE 40 MG/1
40 CAPSULE, DELAYED RELEASE ORAL
Qty: 30 | Refills: 0 | Status: DISCONTINUED | COMMUNITY
Start: 2020-12-04 | End: 2022-04-19

## 2022-04-24 ENCOUNTER — RX RENEWAL (OUTPATIENT)
Age: 52
End: 2022-04-24

## 2022-04-27 ENCOUNTER — RX RENEWAL (OUTPATIENT)
Age: 52
End: 2022-04-27

## 2022-06-17 ENCOUNTER — TRANSCRIPTION ENCOUNTER (OUTPATIENT)
Age: 52
End: 2022-06-17

## 2022-06-30 ENCOUNTER — APPOINTMENT (OUTPATIENT)
Dept: FAMILY MEDICINE | Facility: CLINIC | Age: 52
End: 2022-06-30

## 2022-07-24 ENCOUNTER — TRANSCRIPTION ENCOUNTER (OUTPATIENT)
Age: 52
End: 2022-07-24

## 2022-08-19 ENCOUNTER — TRANSCRIPTION ENCOUNTER (OUTPATIENT)
Age: 52
End: 2022-08-19

## 2022-08-19 RX ORDER — NAPROXEN 500 MG/1
500 TABLET, DELAYED RELEASE ORAL
Qty: 60 | Refills: 2 | Status: ACTIVE | COMMUNITY
Start: 2022-01-27 | End: 1900-01-01

## 2022-09-18 ENCOUNTER — RX RENEWAL (OUTPATIENT)
Age: 52
End: 2022-09-18

## 2022-09-18 RX ORDER — ALPRAZOLAM 0.25 MG/1
0.25 TABLET ORAL
Qty: 20 | Refills: 0 | Status: ACTIVE | COMMUNITY
Start: 2021-12-01 | End: 1900-01-01

## 2022-09-26 ENCOUNTER — TRANSCRIPTION ENCOUNTER (OUTPATIENT)
Age: 52
End: 2022-09-26

## 2022-09-26 RX ORDER — OMEPRAZOLE 40 MG/1
40 CAPSULE, DELAYED RELEASE ORAL
Qty: 90 | Refills: 0 | Status: ACTIVE | COMMUNITY
Start: 2022-03-30 | End: 1900-01-01

## 2022-09-26 RX ORDER — LISINOPRIL 2.5 MG/1
2.5 TABLET ORAL
Qty: 90 | Refills: 0 | Status: ACTIVE | COMMUNITY
Start: 2022-03-17 | End: 1900-01-01

## 2022-12-27 NOTE — ED ADULT NURSE NOTE - RESPIRATORY WDL
----- Message from Milind Quinn MD sent at 12/27/2022  9:42 AM CST -----  Regarding: FW: Question regarding Urinary System MRI  Please set patient up for fusion biopsy  ----- Message -----  From: Sherrell Castillo LPN  Sent: 12/27/2022   7:25 AM CST  To: Milind Quinn MD, Clair Freeman PA-C  Subject: FW: Question regarding Urinary System MRI        Please advise on MRI results. Thank you.   ----- Message -----  From: Ben Pérez  Sent: 12/24/2022   3:53 PM CST  To: PEPITO Quinn [facility] Nurse Msg Pool  Subject: Question regarding Urinary System MRI            What’s next      
Called and spoke with patient. Scheduled for MRI Fusion prostate biopsy on 1/10/23. Nurse visit for education/supplies and extended follow up appointment made at this time. Pt will not need additional rectal culture. Patient had no additional questions or concerns.   
Breathing spontaneous and unlabored. Breath sounds clear and equal bilaterally with regular rhythm.

## 2023-01-16 ENCOUNTER — RX RENEWAL (OUTPATIENT)
Age: 53
End: 2023-01-16

## 2023-08-23 ENCOUNTER — RX RENEWAL (OUTPATIENT)
Age: 53
End: 2023-08-23

## 2024-02-07 ENCOUNTER — RX RENEWAL (OUTPATIENT)
Age: 54
End: 2024-02-07

## 2024-02-07 RX ORDER — METFORMIN HYDROCHLORIDE 500 MG/1
500 TABLET, COATED ORAL TWICE DAILY
Qty: 360 | Refills: 0 | Status: ACTIVE | COMMUNITY
Start: 2022-04-24 | End: 1900-01-01

## 2024-06-08 ENCOUNTER — RX RENEWAL (OUTPATIENT)
Age: 54
End: 2024-06-08

## 2024-09-05 NOTE — ASU PREOP CHECKLIST - MEDICAL/PEDIATRIC CLEARANCE ON MEDICAL RECORD
Problem: Potential for Falls  Goal: Patient will remain free of falls  Description: INTERVENTIONS:  - Educate patient/family on patient safety including physical limitations  - Instruct patient to call for assistance with activity   - Consult OT/PT to assist with strengthening/mobility   - Keep Call bell within reach  - Keep bed low and locked with side rails adjusted as appropriate  - Keep care items and personal belongings within reach  - Initiate and maintain comfort rounds  - Make Fall Risk Sign visible to staff  - Offer Toileting every 2  Hours, in advance of need  - Initiate/Maintain bed alarm  - Obtain necessary fall risk management equipment:   - Apply yellow socks and bracelet for high fall risk patients  - Consider moving patient to room near nurses station  Outcome: Progressing     Problem: Prexisting or High Potential for Compromised Skin Integrity  Goal: Skin integrity is maintained or improved  Description: INTERVENTIONS:  - Identify patients at risk for skin breakdown  - Assess and monitor skin integrity  - Assess and monitor nutrition and hydration status  - Monitor labs   - Assess for incontinence   - Turn and reposition patient  - Assist with mobility/ambulation  - Relieve pressure over bony prominences  - Avoid friction and shearing  - Provide appropriate hygiene as needed including keeping skin clean and dry  - Evaluate need for skin moisturizer/barrier cream  - Collaborate with interdisciplinary team   - Patient/family teaching  - Consider wound care consult   Outcome: Progressing     Problem: PAIN - ADULT  Goal: Verbalizes/displays adequate comfort level or baseline comfort level  Description: Interventions:  - Encourage patient to monitor pain and request assistance  - Assess pain using appropriate pain scale  - Administer analgesics based on type and severity of pain and evaluate response  - Implement non-pharmacological measures as appropriate and evaluate response  - Consider cultural and  medical social influences on pain and pain management  - Notify physician/advanced practitioner if interventions unsuccessful or patient reports new pain  Outcome: Progressing     Problem: DISCHARGE PLANNING  Goal: Discharge to home or other facility with appropriate resources  Description: INTERVENTIONS:  - Identify barriers to discharge w/patient and caregiver  - Arrange for needed discharge resources and transportation as appropriate  - Identify discharge learning needs (meds, wound care, etc.)  - Arrange for interpretive services to assist at discharge as needed  - Refer to Case Management Department for coordinating discharge planning if the patient needs post-hospital services based on physician/advanced practitioner order or complex needs related to functional status, cognitive ability, or social support system  Outcome: Progressing     Problem: Knowledge Deficit  Goal: Patient/family/caregiver demonstrates understanding of disease process, treatment plan, medications, and discharge instructions  Description: Complete learning assessment and assess knowledge base.  Interventions:  - Provide teaching at level of understanding  - Provide teaching via preferred learning methods  Outcome: Progressing     Problem: MUSCULOSKELETAL - ADULT  Goal: Maintain or return mobility to safest level of function  Description: INTERVENTIONS:  - Assess patient's ability to carry out ADLs; assess patient's baseline for ADL function and identify physical deficits which impact ability to perform ADLs (bathing, care of mouth/teeth, toileting, grooming, dressing, etc.)  - Assess/evaluate cause of self-care deficits   - Assess range of motion  - Assess patient's mobility  - Assess patient's need for assistive devices and provide as appropriate  - Encourage maximum independence but intervene and supervise when necessary  - Involve family in performance of ADLs  - Assess for home care needs following discharge   - Consider OT consult to assist  with ADL evaluation and planning for discharge  - Provide patient education as appropriate  Outcome: Progressing  Goal: Maintain proper alignment of affected body part  Description: INTERVENTIONS:  - Support, maintain and protect limb and body alignment  - Provide patient/ family with appropriate education  Outcome: Progressing